# Patient Record
Sex: MALE | Race: BLACK OR AFRICAN AMERICAN | NOT HISPANIC OR LATINO
[De-identification: names, ages, dates, MRNs, and addresses within clinical notes are randomized per-mention and may not be internally consistent; named-entity substitution may affect disease eponyms.]

---

## 2017-07-18 ENCOUNTER — TRANSCRIPTION ENCOUNTER (OUTPATIENT)
Age: 37
End: 2017-07-18

## 2018-01-10 ENCOUNTER — TRANSCRIPTION ENCOUNTER (OUTPATIENT)
Age: 38
End: 2018-01-10

## 2019-07-29 ENCOUNTER — TRANSCRIPTION ENCOUNTER (OUTPATIENT)
Age: 39
End: 2019-07-29

## 2020-05-30 ENCOUNTER — TRANSCRIPTION ENCOUNTER (OUTPATIENT)
Age: 40
End: 2020-05-30

## 2021-02-18 ENCOUNTER — INPATIENT (INPATIENT)
Facility: HOSPITAL | Age: 41
LOS: 7 days | Discharge: HOME | End: 2021-02-26
Attending: SURGERY | Admitting: SURGERY
Payer: COMMERCIAL

## 2021-02-18 VITALS
WEIGHT: 289.91 LBS | OXYGEN SATURATION: 99 % | TEMPERATURE: 99 F | DIASTOLIC BLOOD PRESSURE: 90 MMHG | HEART RATE: 91 BPM | HEIGHT: 71 IN | RESPIRATION RATE: 17 BRPM | SYSTOLIC BLOOD PRESSURE: 137 MMHG

## 2021-02-18 DIAGNOSIS — K63.0 ABSCESS OF INTESTINE: ICD-10-CM

## 2021-02-18 LAB
ALBUMIN SERPL ELPH-MCNC: 3.3 G/DL — SIGNIFICANT CHANGE UP (ref 3.3–5)
ALP SERPL-CCNC: 104 U/L — SIGNIFICANT CHANGE UP (ref 40–120)
ALT FLD-CCNC: 34 U/L — SIGNIFICANT CHANGE UP (ref 12–78)
ANION GAP SERPL CALC-SCNC: 5 MMOL/L — SIGNIFICANT CHANGE UP (ref 5–17)
APPEARANCE UR: CLEAR — SIGNIFICANT CHANGE UP
APTT BLD: 30.8 SEC — SIGNIFICANT CHANGE UP (ref 27.5–35.5)
AST SERPL-CCNC: 14 U/L — LOW (ref 15–37)
BACTERIA # UR AUTO: ABNORMAL
BASOPHILS # BLD AUTO: 0 K/UL — SIGNIFICANT CHANGE UP (ref 0–0.2)
BASOPHILS NFR BLD AUTO: 0 % — SIGNIFICANT CHANGE UP (ref 0–2)
BILIRUB SERPL-MCNC: 0.9 MG/DL — SIGNIFICANT CHANGE UP (ref 0.2–1.2)
BILIRUB UR-MCNC: NEGATIVE — SIGNIFICANT CHANGE UP
BLD GP AB SCN SERPL QL: SIGNIFICANT CHANGE UP
BUN SERPL-MCNC: 10 MG/DL — SIGNIFICANT CHANGE UP (ref 7–23)
CALCIUM SERPL-MCNC: 9.1 MG/DL — SIGNIFICANT CHANGE UP (ref 8.5–10.1)
CHLORIDE SERPL-SCNC: 104 MMOL/L — SIGNIFICANT CHANGE UP (ref 96–108)
CO2 SERPL-SCNC: 28 MMOL/L — SIGNIFICANT CHANGE UP (ref 22–31)
COLOR SPEC: YELLOW — SIGNIFICANT CHANGE UP
CREAT SERPL-MCNC: 1.1 MG/DL — SIGNIFICANT CHANGE UP (ref 0.5–1.3)
DIFF PNL FLD: ABNORMAL
EOSINOPHIL # BLD AUTO: 0 K/UL — SIGNIFICANT CHANGE UP (ref 0–0.5)
EOSINOPHIL NFR BLD AUTO: 0 % — SIGNIFICANT CHANGE UP (ref 0–6)
EPI CELLS # UR: SIGNIFICANT CHANGE UP
FLUAV AG NPH QL: SIGNIFICANT CHANGE UP
FLUBV AG NPH QL: SIGNIFICANT CHANGE UP
GLUCOSE SERPL-MCNC: 96 MG/DL — SIGNIFICANT CHANGE UP (ref 70–99)
GLUCOSE UR QL: NEGATIVE MG/DL — SIGNIFICANT CHANGE UP
HCT VFR BLD CALC: 44.3 % — SIGNIFICANT CHANGE UP (ref 39–50)
HGB BLD-MCNC: 14.1 G/DL — SIGNIFICANT CHANGE UP (ref 13–17)
INR BLD: 1.18 RATIO — HIGH (ref 0.88–1.16)
KETONES UR-MCNC: NEGATIVE — SIGNIFICANT CHANGE UP
LACTATE SERPL-SCNC: 0.8 MMOL/L — SIGNIFICANT CHANGE UP (ref 0.7–2)
LEUKOCYTE ESTERASE UR-ACNC: NEGATIVE — SIGNIFICANT CHANGE UP
LIDOCAIN IGE QN: 77 U/L — SIGNIFICANT CHANGE UP (ref 73–393)
LYMPHOCYTES # BLD AUTO: 1.21 K/UL — SIGNIFICANT CHANGE UP (ref 1–3.3)
LYMPHOCYTES # BLD AUTO: 6 % — LOW (ref 13–44)
MANUAL SMEAR VERIFICATION: SIGNIFICANT CHANGE UP
MCHC RBC-ENTMCNC: 27.1 PG — SIGNIFICANT CHANGE UP (ref 27–34)
MCHC RBC-ENTMCNC: 31.8 GM/DL — LOW (ref 32–36)
MCV RBC AUTO: 85.2 FL — SIGNIFICANT CHANGE UP (ref 80–100)
MONOCYTES # BLD AUTO: 0.81 K/UL — SIGNIFICANT CHANGE UP (ref 0–0.9)
MONOCYTES NFR BLD AUTO: 4 % — SIGNIFICANT CHANGE UP (ref 2–14)
NEUTROPHILS # BLD AUTO: 18.22 K/UL — HIGH (ref 1.8–7.4)
NEUTROPHILS NFR BLD AUTO: 90 % — HIGH (ref 43–77)
NITRITE UR-MCNC: NEGATIVE — SIGNIFICANT CHANGE UP
NRBC # BLD: 0 /100 — SIGNIFICANT CHANGE UP (ref 0–0)
NRBC # BLD: SIGNIFICANT CHANGE UP /100 WBCS (ref 0–0)
PH UR: 5 — SIGNIFICANT CHANGE UP (ref 5–8)
PLAT MORPH BLD: NORMAL — SIGNIFICANT CHANGE UP
PLATELET # BLD AUTO: 399 K/UL — SIGNIFICANT CHANGE UP (ref 150–400)
POTASSIUM SERPL-MCNC: 3.7 MMOL/L — SIGNIFICANT CHANGE UP (ref 3.5–5.3)
POTASSIUM SERPL-SCNC: 3.7 MMOL/L — SIGNIFICANT CHANGE UP (ref 3.5–5.3)
PROT SERPL-MCNC: 7.5 GM/DL — SIGNIFICANT CHANGE UP (ref 6–8.3)
PROT UR-MCNC: 15 MG/DL
PROTHROM AB SERPL-ACNC: 13.6 SEC — SIGNIFICANT CHANGE UP (ref 10.6–13.6)
RBC # BLD: 5.2 M/UL — SIGNIFICANT CHANGE UP (ref 4.2–5.8)
RBC # FLD: 13.6 % — SIGNIFICANT CHANGE UP (ref 10.3–14.5)
RBC BLD AUTO: NORMAL — SIGNIFICANT CHANGE UP
RBC CASTS # UR COMP ASSIST: SIGNIFICANT CHANGE UP /HPF (ref 0–4)
SARS-COV-2 RNA SPEC QL NAA+PROBE: SIGNIFICANT CHANGE UP
SODIUM SERPL-SCNC: 137 MMOL/L — SIGNIFICANT CHANGE UP (ref 135–145)
SP GR SPEC: 1.02 — SIGNIFICANT CHANGE UP (ref 1.01–1.02)
UROBILINOGEN FLD QL: NEGATIVE MG/DL — SIGNIFICANT CHANGE UP
WBC # BLD: 20.24 K/UL — HIGH (ref 3.8–10.5)
WBC # FLD AUTO: 20.24 K/UL — HIGH (ref 3.8–10.5)

## 2021-02-18 PROCEDURE — 74177 CT ABD & PELVIS W/CONTRAST: CPT | Mod: 26,MA

## 2021-02-18 PROCEDURE — 99222 1ST HOSP IP/OBS MODERATE 55: CPT

## 2021-02-18 PROCEDURE — 99285 EMERGENCY DEPT VISIT HI MDM: CPT

## 2021-02-18 RX ORDER — MORPHINE SULFATE 50 MG/1
4 CAPSULE, EXTENDED RELEASE ORAL EVERY 4 HOURS
Refills: 0 | Status: DISCONTINUED | OUTPATIENT
Start: 2021-02-18 | End: 2021-02-23

## 2021-02-18 RX ORDER — SODIUM CHLORIDE 9 MG/ML
1000 INJECTION INTRAMUSCULAR; INTRAVENOUS; SUBCUTANEOUS ONCE
Refills: 0 | Status: COMPLETED | OUTPATIENT
Start: 2021-02-18 | End: 2021-02-18

## 2021-02-18 RX ORDER — SODIUM CHLORIDE 9 MG/ML
1000 INJECTION, SOLUTION INTRAVENOUS ONCE
Refills: 0 | Status: COMPLETED | OUTPATIENT
Start: 2021-02-18 | End: 2021-02-18

## 2021-02-18 RX ORDER — MORPHINE SULFATE 50 MG/1
4 CAPSULE, EXTENDED RELEASE ORAL ONCE
Refills: 0 | Status: DISCONTINUED | OUTPATIENT
Start: 2021-02-18 | End: 2021-02-18

## 2021-02-18 RX ORDER — ENOXAPARIN SODIUM 100 MG/ML
40 INJECTION SUBCUTANEOUS DAILY
Refills: 0 | Status: DISCONTINUED | OUTPATIENT
Start: 2021-02-18 | End: 2021-02-26

## 2021-02-18 RX ORDER — PIPERACILLIN AND TAZOBACTAM 4; .5 G/20ML; G/20ML
3.38 INJECTION, POWDER, LYOPHILIZED, FOR SOLUTION INTRAVENOUS ONCE
Refills: 0 | Status: COMPLETED | OUTPATIENT
Start: 2021-02-18 | End: 2021-02-18

## 2021-02-18 RX ORDER — KETOROLAC TROMETHAMINE 30 MG/ML
15 SYRINGE (ML) INJECTION EVERY 6 HOURS
Refills: 0 | Status: DISCONTINUED | OUTPATIENT
Start: 2021-02-18 | End: 2021-02-18

## 2021-02-18 RX ORDER — SODIUM CHLORIDE 9 MG/ML
1000 INJECTION, SOLUTION INTRAVENOUS
Refills: 0 | Status: DISCONTINUED | OUTPATIENT
Start: 2021-02-18 | End: 2021-02-21

## 2021-02-18 RX ORDER — ACETAMINOPHEN 500 MG
1000 TABLET ORAL ONCE
Refills: 0 | Status: COMPLETED | OUTPATIENT
Start: 2021-02-18 | End: 2021-02-18

## 2021-02-18 RX ORDER — VANCOMYCIN HCL 1 G
1000 VIAL (EA) INTRAVENOUS ONCE
Refills: 0 | Status: COMPLETED | OUTPATIENT
Start: 2021-02-18 | End: 2021-02-18

## 2021-02-18 RX ORDER — ACETAMINOPHEN 500 MG
1000 TABLET ORAL ONCE
Refills: 0 | Status: COMPLETED | OUTPATIENT
Start: 2021-02-18 | End: 2021-02-19

## 2021-02-18 RX ORDER — KETOROLAC TROMETHAMINE 30 MG/ML
30 SYRINGE (ML) INJECTION EVERY 6 HOURS
Refills: 0 | Status: DISCONTINUED | OUTPATIENT
Start: 2021-02-18 | End: 2021-02-21

## 2021-02-18 RX ORDER — ONDANSETRON 8 MG/1
4 TABLET, FILM COATED ORAL ONCE
Refills: 0 | Status: DISCONTINUED | OUTPATIENT
Start: 2021-02-18 | End: 2021-02-18

## 2021-02-18 RX ORDER — PIPERACILLIN AND TAZOBACTAM 4; .5 G/20ML; G/20ML
3.38 INJECTION, POWDER, LYOPHILIZED, FOR SOLUTION INTRAVENOUS EVERY 8 HOURS
Refills: 0 | Status: COMPLETED | OUTPATIENT
Start: 2021-02-19 | End: 2021-02-25

## 2021-02-18 RX ORDER — MORPHINE SULFATE 50 MG/1
2 CAPSULE, EXTENDED RELEASE ORAL EVERY 4 HOURS
Refills: 0 | Status: DISCONTINUED | OUTPATIENT
Start: 2021-02-18 | End: 2021-02-23

## 2021-02-18 RX ORDER — OXYCODONE AND ACETAMINOPHEN 5; 325 MG/1; MG/1
1 TABLET ORAL ONCE
Refills: 0 | Status: DISCONTINUED | OUTPATIENT
Start: 2021-02-18 | End: 2021-02-18

## 2021-02-18 RX ORDER — MORPHINE SULFATE 50 MG/1
2 CAPSULE, EXTENDED RELEASE ORAL EVERY 4 HOURS
Refills: 0 | Status: DISCONTINUED | OUTPATIENT
Start: 2021-02-18 | End: 2021-02-18

## 2021-02-18 RX ADMIN — SODIUM CHLORIDE 1000 MILLILITER(S): 9 INJECTION INTRAMUSCULAR; INTRAVENOUS; SUBCUTANEOUS at 18:34

## 2021-02-18 RX ADMIN — Medication 250 MILLIGRAM(S): at 18:11

## 2021-02-18 RX ADMIN — PIPERACILLIN AND TAZOBACTAM 200 GRAM(S): 4; .5 INJECTION, POWDER, LYOPHILIZED, FOR SOLUTION INTRAVENOUS at 18:11

## 2021-02-18 RX ADMIN — SODIUM CHLORIDE 1000 MILLILITER(S): 9 INJECTION, SOLUTION INTRAVENOUS at 21:14

## 2021-02-18 RX ADMIN — OXYCODONE AND ACETAMINOPHEN 1 TABLET(S): 5; 325 TABLET ORAL at 15:37

## 2021-02-18 RX ADMIN — MORPHINE SULFATE 4 MILLIGRAM(S): 50 CAPSULE, EXTENDED RELEASE ORAL at 18:11

## 2021-02-18 RX ADMIN — PIPERACILLIN AND TAZOBACTAM 3.38 GRAM(S): 4; .5 INJECTION, POWDER, LYOPHILIZED, FOR SOLUTION INTRAVENOUS at 18:58

## 2021-02-18 RX ADMIN — Medication 400 MILLIGRAM(S): at 22:21

## 2021-02-18 RX ADMIN — Medication 1000 MILLIGRAM(S): at 21:20

## 2021-02-18 RX ADMIN — SODIUM CHLORIDE 1000 MILLILITER(S): 9 INJECTION INTRAMUSCULAR; INTRAVENOUS; SUBCUTANEOUS at 18:16

## 2021-02-18 NOTE — ED PROVIDER NOTE - NS ED ROS FT
Constitutional: (+) Chills, (+) Anorexia, (-) Fever  Skin: (-) Rashes  Eyes: (-) Visual changes, (-) Discharge, (-) Redness  Nose: (-) Nasal congestion, (-) Runny nose  Mouth/Throat: (-) Sore throat  CV: (-) Chest pain, (-) Palpitations  Resp: (-) Cough, (-) Shortness of breath, (-) Dyspnea on Exertion, (-) Wheezing  GI: (+) Abdominal pain, (+) Nausea, (-) Vomiting, (-) Diarrhea, (-) Constipation, (-) Melena  : (-) Dysuria, (-) Hematuria, (-) Increased frequency  MSK: (-) Weakness, (-) Myalgias, (-) Back pain  Neuro: (-) Headache

## 2021-02-18 NOTE — ED ADULT TRIAGE NOTE - CHIEF COMPLAINT QUOTE
pt c/o LLQ pain since Monday denies hematuria or dysuria, c/o nausea denies vomiting. Pt states he felt pain to left flank a few times

## 2021-02-18 NOTE — ED PROVIDER NOTE - NONTENDER LOCATION
left upper quadrant/right upper quadrant/right lower quadrant/periumbilical/umbilical/suprapubic/left costovertebral angle/right costovertebral angle

## 2021-02-18 NOTE — H&P ADULT - ATTENDING COMMENTS
Mr. Ahmadi was examined. He continues to have abdominal pain. I have reviewed the cross sectional imaging personally. This patient has a history of colonoscopy and polypectomy approximately 6 months ago. Four days prior to arrival of the [facility] ED he had a repeat colonoscopy and ?polypectomy? That evening he had chills, night sweats, and worsening abdominal pain. On exam: he is febrile, his abdomen is obese, soft, nondistended, he has left shelby-abdominal tenderness. Based on imaging he has descending /sigmoid inflammation with pericolonic abscess.     I have discussed the findings and anticipated course with he and his mother. The surgical team has reached out to his GI to determine the colonoscopy findings.  Will continue bowel rest, nothing by mouth, broad spectrum antibiotics, and multimodal pain control. If he has worsening abdominal pain or signs of clinical deterioration will proceed to the operating room.

## 2021-02-18 NOTE — ED PROVIDER NOTE - CLINICAL SUMMARY MEDICAL DECISION MAKING FREE TEXT BOX
Abdominal pain, nausea, chills x 3 days. Vital signs stable, LLQ tenderness with involuntary guarding. Will get labs, meds, CT abd/pelvis with IV contrast, reassess.

## 2021-02-18 NOTE — PATIENT PROFILE ADULT - NSTOBACCO QUIT READY_GEN_A_CORE_SD
pt was tobacco free since 2011. reports starting smoking again a few weeks ago/not motivated to quit

## 2021-02-18 NOTE — ED PROVIDER NOTE - PROGRESS NOTE DETAILS
Carolyn MALHOTRA: Patient sister Eric and mother Alber given update with patient permission. Mom can be reached at [phone]. VSS. Patient in no acute distress. Pain controlled.

## 2021-02-18 NOTE — H&P ADULT - NSHPPHYSICALEXAM_GEN_ALL_CORE
Vital Signs Last 24 Hrs  T(C): 37.2 (18 Feb 2021 14:04), Max: 37.2 (18 Feb 2021 14:04)  T(F): 99 (18 Feb 2021 14:04), Max: 99 (18 Feb 2021 14:04)  HR: 80 (18 Feb 2021 17:40) (80 - 91)  BP: 139/82 (18 Feb 2021 17:40) (137/90 - 139/82)  RR: 20 (18 Feb 2021 17:40) (17 - 20)  SpO2: 99% (18 Feb 2021 17:40) (99% - 99%)    PHYSICAL EXAM:  GENERAL: NAD, well-groomed, well-developed  HEAD:  Atraumatic, Normocephalic  EYES: EOMI, PERRL, conjunctiva and sclera clear  ENMT: No tonsillar erythema, exudates, or enlargement; Moist mucous membranes  NECK: Supple, No JVD, Normal thyroid  NERVOUS SYSTEM:  Alert & Oriented X3, Good concentration; Motor Strength 5/5 B/L upper and lower extremities  CHEST/LUNG: Clear to auscultation bilaterally; No rales, rhonchi, wheezing, or rubs  HEART: Regular rate and rhythm; No murmurs appreciated  ABDOMEN: Pain to palpation in LLQ, Soft, Nondistended; Bowel sounds present  MSK: ROM intact in all extremities, 5/5 strength in upper and lower extremities, B/L.  EXTREMITIES:  2+ Peripheral Pulses, No clubbing, cyanosis, or edema  LYMPH: No lymphadenopathy noted  SKIN: No rashes or lesions

## 2021-02-18 NOTE — H&P ADULT - HISTORY OF PRESENT ILLNESS
41 YO M with a sig PMHx of colonoscopy with polyp, with no sig PSHx. Patient presents with severe 10/10 sharp pain in the lower abdomen, non-radiating. He took Motrin, which did not help. Patient had a colonoscopy last week Teusday, for re-evaluation for new polyps. Patient states he last had a normal BM yesterday, and a small BM this AM. Patient admits to occasional cigarette smoking, and ETOH on weekends. Denies CP, SOB, palpitations, nausea or vomiting.

## 2021-02-18 NOTE — ED PROVIDER NOTE - ATTENDING CONTRIBUTION TO CARE
I have seen the patient with the PA and agree with above examination and assessment and plan with the following addendum:        Focused PE:   General: NAD, alert and oriented  Head: Normocephalic, atraumatic  Eyes: PERRLA, EOMI  Cardiac: RRR, no murmurs, rubs or gallops  Resp: CTA, no wheezes, rales or ronchi  GI: obese. tender in LLQ. Rebound, no guarding  MSK: non edematous  Neuro: Alert and oriented, no focal deficits. Normal gait  Ext: Non edematous, nontender.     Ddx: Perforation from colonoscopy/ colitis  Plan: Cbc, cmp, lactate, blood cx, pain control, ct abd, reassess

## 2021-02-18 NOTE — H&P ADULT - PROBLEM SELECTOR PLAN 1
Admit to surgery service  Continue Zosyn  NPO  IV hydration  Pain management  Serial exams  IR consult

## 2021-02-18 NOTE — ED PROVIDER NOTE - OBJECTIVE STATEMENT
40y Male with no PMHx presents to the ER for abdominal pain. Patient reports left lower quadrant abdominal pain x 4 days. Reports achy, consistent 4/10 pain. States today pain became 10/10. Motrin/Tylenol has made pain tolerable over the last few days. Associated with nausea, decreased appetite and chills. No vomiting, diarrhea or fever.    Of note, patient had colonoscopy last Tuesday. States that similar pain happened after taking the colonoscopy prep but resolved after the colonoscopy.

## 2021-02-18 NOTE — H&P ADULT - NSHPREVIEWOFSYSTEMS_GEN_ALL_CORE
REVIEW OF SYSTEMS:  Constitutional: Denies fever, weight loss, fatigue  Eye: Denies eye pain, visual changes, discharge, blurred vision  ENT: Denies hearing changes, tinnitus, vertigo, sinus congestion, sore throat  Neck: Denies pain or stiffness  Respiratory: Denies cough, wheezing, chills, hemoptysis, shortness of breath, difficulty breathing  Cardiovascular: Denies chest pain, palpitations, dizziness, leg swelling  Gastrointestinal: Denies abdominal pain, nausea, vomiting, hematemesis, diarrhea, constipation, melena, hematochezia  Genitourinary: Denies dysuria, frequency, hematuria, retention, incontinence  Neurological: Denies headaches, memory loss, loss of strength, numbness, tremors  Skin: Denies itching, burning, rashes, lesions   Endocrine: Denies heat or cold intolerance, hair loss  Musculoskeletal: Denies joint pain or swelling, back, extremity pain  Psychiatric: Denies depression, anxiety, mood swings, difficulty sleeping, suicidal ideation  Hematology: Denies easy bruising, bleeding gums  Immunologic: Denies hives or eczema

## 2021-02-19 LAB
ANION GAP SERPL CALC-SCNC: 9 MMOL/L — SIGNIFICANT CHANGE UP (ref 5–17)
BUN SERPL-MCNC: 9 MG/DL — SIGNIFICANT CHANGE UP (ref 7–23)
CALCIUM SERPL-MCNC: 8.5 MG/DL — SIGNIFICANT CHANGE UP (ref 8.5–10.1)
CHLORIDE SERPL-SCNC: 103 MMOL/L — SIGNIFICANT CHANGE UP (ref 96–108)
CO2 SERPL-SCNC: 26 MMOL/L — SIGNIFICANT CHANGE UP (ref 22–31)
CREAT SERPL-MCNC: 1.05 MG/DL — SIGNIFICANT CHANGE UP (ref 0.5–1.3)
CULTURE RESULTS: SIGNIFICANT CHANGE UP
GLUCOSE SERPL-MCNC: 106 MG/DL — HIGH (ref 70–99)
HCT VFR BLD CALC: 39.7 % — SIGNIFICANT CHANGE UP (ref 39–50)
HGB BLD-MCNC: 13.1 G/DL — SIGNIFICANT CHANGE UP (ref 13–17)
MAGNESIUM SERPL-MCNC: 1.9 MG/DL — SIGNIFICANT CHANGE UP (ref 1.6–2.6)
MCHC RBC-ENTMCNC: 27.9 PG — SIGNIFICANT CHANGE UP (ref 27–34)
MCHC RBC-ENTMCNC: 33 GM/DL — SIGNIFICANT CHANGE UP (ref 32–36)
MCV RBC AUTO: 84.6 FL — SIGNIFICANT CHANGE UP (ref 80–100)
NRBC # BLD: 0 /100 WBCS — SIGNIFICANT CHANGE UP (ref 0–0)
PHOSPHATE SERPL-MCNC: 3.5 MG/DL — SIGNIFICANT CHANGE UP (ref 2.5–4.5)
PLATELET # BLD AUTO: 370 K/UL — SIGNIFICANT CHANGE UP (ref 150–400)
POTASSIUM SERPL-MCNC: 3.3 MMOL/L — LOW (ref 3.5–5.3)
POTASSIUM SERPL-SCNC: 3.3 MMOL/L — LOW (ref 3.5–5.3)
RBC # BLD: 4.69 M/UL — SIGNIFICANT CHANGE UP (ref 4.2–5.8)
RBC # FLD: 13.8 % — SIGNIFICANT CHANGE UP (ref 10.3–14.5)
SARS-COV-2 IGG SERPL QL IA: NEGATIVE — SIGNIFICANT CHANGE UP
SARS-COV-2 IGM SERPL IA-ACNC: <0.1 INDEX — SIGNIFICANT CHANGE UP
SODIUM SERPL-SCNC: 138 MMOL/L — SIGNIFICANT CHANGE UP (ref 135–145)
SPECIMEN SOURCE: SIGNIFICANT CHANGE UP
WBC # BLD: 21.29 K/UL — HIGH (ref 3.8–10.5)
WBC # FLD AUTO: 21.29 K/UL — HIGH (ref 3.8–10.5)

## 2021-02-19 PROCEDURE — 99232 SBSQ HOSP IP/OBS MODERATE 35: CPT

## 2021-02-19 RX ORDER — POTASSIUM CHLORIDE 20 MEQ
10 PACKET (EA) ORAL
Refills: 0 | Status: COMPLETED | OUTPATIENT
Start: 2021-02-19 | End: 2021-02-19

## 2021-02-19 RX ADMIN — MORPHINE SULFATE 2 MILLIGRAM(S): 50 CAPSULE, EXTENDED RELEASE ORAL at 16:03

## 2021-02-19 RX ADMIN — Medication 30 MILLIGRAM(S): at 23:35

## 2021-02-19 RX ADMIN — SODIUM CHLORIDE 170 MILLILITER(S): 9 INJECTION, SOLUTION INTRAVENOUS at 21:17

## 2021-02-19 RX ADMIN — Medication 400 MILLIGRAM(S): at 16:04

## 2021-02-19 RX ADMIN — Medication 100 MILLIEQUIVALENT(S): at 08:52

## 2021-02-19 RX ADMIN — Medication 100 MILLIEQUIVALENT(S): at 11:41

## 2021-02-19 RX ADMIN — Medication 30 MILLIGRAM(S): at 00:52

## 2021-02-19 RX ADMIN — PIPERACILLIN AND TAZOBACTAM 25 GRAM(S): 4; .5 INJECTION, POWDER, LYOPHILIZED, FOR SOLUTION INTRAVENOUS at 08:39

## 2021-02-19 RX ADMIN — Medication 30 MILLIGRAM(S): at 18:13

## 2021-02-19 RX ADMIN — Medication 30 MILLIGRAM(S): at 11:43

## 2021-02-19 RX ADMIN — PIPERACILLIN AND TAZOBACTAM 25 GRAM(S): 4; .5 INJECTION, POWDER, LYOPHILIZED, FOR SOLUTION INTRAVENOUS at 00:51

## 2021-02-19 RX ADMIN — MORPHINE SULFATE 2 MILLIGRAM(S): 50 CAPSULE, EXTENDED RELEASE ORAL at 08:38

## 2021-02-19 RX ADMIN — Medication 100 MILLIEQUIVALENT(S): at 10:22

## 2021-02-19 RX ADMIN — PIPERACILLIN AND TAZOBACTAM 25 GRAM(S): 4; .5 INJECTION, POWDER, LYOPHILIZED, FOR SOLUTION INTRAVENOUS at 18:13

## 2021-02-19 RX ADMIN — Medication 30 MILLIGRAM(S): at 05:56

## 2021-02-19 RX ADMIN — ENOXAPARIN SODIUM 40 MILLIGRAM(S): 100 INJECTION SUBCUTANEOUS at 11:44

## 2021-02-19 NOTE — PROGRESS NOTE ADULT - SUBJECTIVE AND OBJECTIVE BOX
SURGERY PROGRESS HPI:  Pt seen and examined at bedside. Left abdominal pain is improving, well controlled on pain medication. Patient received Ofirmev and Toradol overnight. Did not need morphine. Pt denies complaints. Pt tolerating ice chips. Pt denies nausea and vomiting. Passed flatus this morning. Last BM was small yesterday morning. Voiding well. Pt denies chest pain, SOB, dizziness, fever, chills. Ambulating.      Vital Signs Last 24 Hrs  T(C): 37.3 (2021 00:06), Max: 38.5 (2021 22:05)  T(F): 99.1 (2021 00:06), Max: 101.3 (2021 22:05)  HR: 109 (2021 00:06) (80 - 109)  BP: 132/75 (2021 00:06) (125/85 - 143/81)  BP(mean): --  RR: 18 (2021 00:06) (17 - 20)  SpO2: 95% (2021 00:06) (95% - 99%)      PHYSICAL EXAM:    GENERAL: NAD  CHEST/LUNG: Clear to ausculation, bilaterally   HEART: RRR S1S2  ABDOMEN: Obese, non distended, +BS, soft, left-sided tenderness, no guarding, no rigidity   EXTREMITIES:  calf soft, non tender b/l      I&O's Detail      LABS:                        14.1   20.24 )-----------( 399      ( 2021 15:49 )             44.3     02-18    137  |  104  |  10  ----------------------------<  96  3.7   |  28  |  1.10    Ca    9.1      2021 15:49    TPro  7.5  /  Alb  3.3  /  TBili  0.9  /  DBili  x   /  AST  14<L>  /  ALT  34  /  AlkPhos  104  02-18    PT/INR - ( 2021 17:45 )   PT: 13.6 sec;   INR: 1.18 ratio         PTT - ( 2021 17:45 )  PTT:30.8 sec  Urinalysis Basic - ( 2021 17:49 )    Color: Yellow / Appearance: Clear / S.020 / pH: x  Gluc: x / Ketone: Negative  / Bili: Negative / Urobili: Negative mg/dL   Blood: x / Protein: 15 mg/dL / Nitrite: Negative   Leuk Esterase: Negative / RBC: 0-2 /HPF / WBC x   Sq Epi: x / Non Sq Epi: Occasional / Bacteria: Moderate        Assessment: 40M PMHx colonoscopy and polypectomy approximately 6 months ago, repeat colonoscopy and ?polypectomy admitted with colitis and perisigmoid fluid collection 1.5 x 1.2 x 3.8 cm. Tmax overnight: 101.3.    Plan:  -Continue Zosyn  -NPO with ice chips, IV hydration  -IR evaluation today  -Awaiting call back from GI MD Dr. Manrique [phone].  -pain management prn  -continue DVT prophylaxis, OOB, Ambulating   -f/u AM labs  -Pt seen and examined with Dr. Garsia last night who discussed plan with patient's mother ([phone]). SURGERY PROGRESS HPI:  Pt seen and examined at bedside. Left abdominal pain is improving, well controlled on pain medication. Patient received Ofirmev and Toradol overnight. Did not need morphine. Pt denies complaints. Pt tolerating ice chips. Pt denies nausea and vomiting. Passed flatus this morning. Last BM was small yesterday morning. Voiding well. Pt denies chest pain, SOB, dizziness, fever, chills. Ambulating.      Vital Signs Last 24 Hrs  T(C): 37.3 (2021 00:06), Max: 38.5 (2021 22:05)  T(F): 99.1 (2021 00:06), Max: 101.3 (2021 22:05)  HR: 109 (2021 00:06) (80 - 109)  BP: 132/75 (2021 00:06) (125/85 - 143/81)  BP(mean): --  RR: 18 (2021 00:06) (17 - 20)  SpO2: 95% (2021 00:06) (95% - 99%)      PHYSICAL EXAM:    GENERAL: NAD  CHEST/LUNG: Clear to ausculation, bilaterally   HEART: RRR S1S2  ABDOMEN: Obese, non distended, +BS, soft, left-sided tenderness, no guarding, no rigidity   EXTREMITIES:  calf soft, non tender b/l      I&O's Detail      LABS:                        14.1   20.24 )-----------( 399      ( 2021 15:49 )             44.3     02-18    137  |  104  |  10  ----------------------------<  96  3.7   |  28  |  1.10    Ca    9.1      2021 15:49    TPro  7.5  /  Alb  3.3  /  TBili  0.9  /  DBili  x   /  AST  14<L>  /  ALT  34  /  AlkPhos  104  02-18    PT/INR - ( 2021 17:45 )   PT: 13.6 sec;   INR: 1.18 ratio         PTT - ( 2021 17:45 )  PTT:30.8 sec  Urinalysis Basic - ( 2021 17:49 )    Color: Yellow / Appearance: Clear / S.020 / pH: x  Gluc: x / Ketone: Negative  / Bili: Negative / Urobili: Negative mg/dL   Blood: x / Protein: 15 mg/dL / Nitrite: Negative   Leuk Esterase: Negative / RBC: 0-2 /HPF / WBC x   Sq Epi: x / Non Sq Epi: Occasional / Bacteria: Moderate        Assessment: 40M PMHx colonoscopy and polypectomy approximately 6 months ago, recent repeat colonoscopy and ?polypectomy admitted with colitis and perisigmoid fluid collection 1.5 x 1.2 x 3.8 cm. Tmax overnight: 101.3.    Plan:  -Continue Zosyn  -NPO with ice chips, IV hydration  -IR evaluation today  -Awaiting call back from GI MD Dr. Manrique [phone].  -pain management prn  -continue DVT prophylaxis, OOB, Ambulating   -f/u AM labs  -Pt seen and examined with Dr. Garsia last night who discussed plan with patient's mother ([phone]).

## 2021-02-19 NOTE — CONSULT NOTE ADULT - ASSESSMENT
Interventional Radiology    Evaluate for Procedure: abscess drain    HPI: 40y Male with severe sigmoiditis/likely perforation    Allergies:   Medications (Abx/Cardiac/Anticoagulation/Blood Products)    piperacillin/tazobactam IVPB..: 25 mL/Hr IV Intermittent (02-19 @ 00:51)  piperacillin/tazobactam IVPB...: 200 mL/Hr IV Intermittent (02-18 @ 18:11)  vancomycin  IVPB.: 250 mL/Hr IV Intermittent (02-18 @ 18:11)    Data:  180.3  130.6  T(C): 37.4  HR: 97  BP: 119/80  RR: 18  SpO2: 95%    -WBC 21.29 / HgB 13.1 / Hct 39.7 / Plt 370  -Na 138 / Cl 103 / BUN 9 / Glucose 106  -K 3.3 / CO2 26 / Cr 1.05  -ALT -- / Alk Phos -- / T.Bili --  -INR 1.18 / PTT 30.8    Radiology:     Assessment/Plan:   -40y Male with severe colitis/likely perf at the prox sigmoid.  -No drainable collection, mostly edema/inflammation/possible phlegmon. Recommend conservative management and repeat imaging to reassess for abscess formation if any.

## 2021-02-20 LAB
ANION GAP SERPL CALC-SCNC: 9 MMOL/L — SIGNIFICANT CHANGE UP (ref 5–17)
BUN SERPL-MCNC: 8 MG/DL — SIGNIFICANT CHANGE UP (ref 7–23)
CALCIUM SERPL-MCNC: 8.6 MG/DL — SIGNIFICANT CHANGE UP (ref 8.5–10.1)
CHLORIDE SERPL-SCNC: 103 MMOL/L — SIGNIFICANT CHANGE UP (ref 96–108)
CO2 SERPL-SCNC: 25 MMOL/L — SIGNIFICANT CHANGE UP (ref 22–31)
CREAT SERPL-MCNC: 0.94 MG/DL — SIGNIFICANT CHANGE UP (ref 0.5–1.3)
GLUCOSE SERPL-MCNC: 68 MG/DL — LOW (ref 70–99)
HCT VFR BLD CALC: 39.1 % — SIGNIFICANT CHANGE UP (ref 39–50)
HGB BLD-MCNC: 12.6 G/DL — LOW (ref 13–17)
MAGNESIUM SERPL-MCNC: 2.1 MG/DL — SIGNIFICANT CHANGE UP (ref 1.6–2.6)
MCHC RBC-ENTMCNC: 27.1 PG — SIGNIFICANT CHANGE UP (ref 27–34)
MCHC RBC-ENTMCNC: 32.2 GM/DL — SIGNIFICANT CHANGE UP (ref 32–36)
MCV RBC AUTO: 84.1 FL — SIGNIFICANT CHANGE UP (ref 80–100)
NRBC # BLD: 0 /100 WBCS — SIGNIFICANT CHANGE UP (ref 0–0)
PHOSPHATE SERPL-MCNC: 2 MG/DL — LOW (ref 2.5–4.5)
PLATELET # BLD AUTO: 349 K/UL — SIGNIFICANT CHANGE UP (ref 150–400)
POTASSIUM SERPL-MCNC: 3.4 MMOL/L — LOW (ref 3.5–5.3)
POTASSIUM SERPL-SCNC: 3.4 MMOL/L — LOW (ref 3.5–5.3)
RBC # BLD: 4.65 M/UL — SIGNIFICANT CHANGE UP (ref 4.2–5.8)
RBC # FLD: 13.7 % — SIGNIFICANT CHANGE UP (ref 10.3–14.5)
SODIUM SERPL-SCNC: 137 MMOL/L — SIGNIFICANT CHANGE UP (ref 135–145)
WBC # BLD: 23.57 K/UL — HIGH (ref 3.8–10.5)
WBC # FLD AUTO: 23.57 K/UL — HIGH (ref 3.8–10.5)

## 2021-02-20 PROCEDURE — 71045 X-RAY EXAM CHEST 1 VIEW: CPT | Mod: 26

## 2021-02-20 RX ORDER — ACETAMINOPHEN 500 MG
1000 TABLET ORAL ONCE
Refills: 0 | Status: COMPLETED | OUTPATIENT
Start: 2021-02-20 | End: 2021-02-20

## 2021-02-20 RX ORDER — ACETAMINOPHEN 500 MG
1000 TABLET ORAL ONCE
Refills: 0 | Status: COMPLETED | OUTPATIENT
Start: 2021-02-20 | End: 2021-02-21

## 2021-02-20 RX ORDER — POTASSIUM PHOSPHATE, MONOBASIC POTASSIUM PHOSPHATE, DIBASIC 236; 224 MG/ML; MG/ML
30 INJECTION, SOLUTION INTRAVENOUS ONCE
Refills: 0 | Status: COMPLETED | OUTPATIENT
Start: 2021-02-20 | End: 2021-02-20

## 2021-02-20 RX ADMIN — MORPHINE SULFATE 2 MILLIGRAM(S): 50 CAPSULE, EXTENDED RELEASE ORAL at 08:27

## 2021-02-20 RX ADMIN — PIPERACILLIN AND TAZOBACTAM 25 GRAM(S): 4; .5 INJECTION, POWDER, LYOPHILIZED, FOR SOLUTION INTRAVENOUS at 16:44

## 2021-02-20 RX ADMIN — PIPERACILLIN AND TAZOBACTAM 25 GRAM(S): 4; .5 INJECTION, POWDER, LYOPHILIZED, FOR SOLUTION INTRAVENOUS at 23:07

## 2021-02-20 RX ADMIN — Medication 400 MILLIGRAM(S): at 00:44

## 2021-02-20 RX ADMIN — Medication 30 MILLIGRAM(S): at 16:43

## 2021-02-20 RX ADMIN — Medication 400 MILLIGRAM(S): at 06:38

## 2021-02-20 RX ADMIN — SODIUM CHLORIDE 170 MILLILITER(S): 9 INJECTION, SOLUTION INTRAVENOUS at 02:41

## 2021-02-20 RX ADMIN — PIPERACILLIN AND TAZOBACTAM 25 GRAM(S): 4; .5 INJECTION, POWDER, LYOPHILIZED, FOR SOLUTION INTRAVENOUS at 00:44

## 2021-02-20 RX ADMIN — MORPHINE SULFATE 2 MILLIGRAM(S): 50 CAPSULE, EXTENDED RELEASE ORAL at 12:43

## 2021-02-20 RX ADMIN — Medication 30 MILLIGRAM(S): at 23:07

## 2021-02-20 RX ADMIN — POTASSIUM PHOSPHATE, MONOBASIC POTASSIUM PHOSPHATE, DIBASIC 83.33 MILLIMOLE(S): 236; 224 INJECTION, SOLUTION INTRAVENOUS at 08:47

## 2021-02-20 RX ADMIN — MORPHINE SULFATE 4 MILLIGRAM(S): 50 CAPSULE, EXTENDED RELEASE ORAL at 18:48

## 2021-02-20 RX ADMIN — ENOXAPARIN SODIUM 40 MILLIGRAM(S): 100 INJECTION SUBCUTANEOUS at 11:21

## 2021-02-20 RX ADMIN — Medication 30 MILLIGRAM(S): at 06:10

## 2021-02-20 RX ADMIN — PIPERACILLIN AND TAZOBACTAM 25 GRAM(S): 4; .5 INJECTION, POWDER, LYOPHILIZED, FOR SOLUTION INTRAVENOUS at 08:28

## 2021-02-20 RX ADMIN — MORPHINE SULFATE 4 MILLIGRAM(S): 50 CAPSULE, EXTENDED RELEASE ORAL at 02:37

## 2021-02-20 RX ADMIN — Medication 400 MILLIGRAM(S): at 16:43

## 2021-02-20 NOTE — PROGRESS NOTE ADULT - SUBJECTIVE AND OBJECTIVE BOX
SURGERY PROGRESS HPI:  Pt seen and examined at bedside. L-sided abdominal pain is similar to yesterday, well controlled on pain medication. No acute events overnight. Pt tolerating ice chips. Pt denies nausea and vomiting. Passing flatus. No BM overnight. Voiding well. Pt denies chest pain, SOB, dizziness, fever, chills. Ambulating.      Vital Signs Last 24 Hrs  T(C): 36.8 (2021 02:48), Max: 38.3 (2021 17:02)  T(F): 98.2 (2021 02:48), Max: 101 (2021 17:02)  HR: 97 (2021 23:34) (97 - 102)  BP: 140/72 (2021 23:34) (124/82 - 140/72)  BP(mean): --  RR: 18 (2021 23:34) (18 - 19)  SpO2: 95% (2021 23:34) (93% - 95%)      PHYSICAL EXAM:  GENERAL: NAD  CHEST/LUNG: Clear to ausculation, bilaterally   HEART: RRR S1S2  ABDOMEN: Obese, non distended, +BS, soft, left-sided tenderness, no guarding, no rigidity   EXTREMITIES:  calf soft, non tender b/l    I&O's Detail    2021 07:01  -  2021 07:00  --------------------------------------------------------  IN:  Total IN: 0 mL    OUT:    Voided (mL): 500 mL  Total OUT: 500 mL    Total NET: -500 mL      2021 07:01  -  2021 06:27  --------------------------------------------------------  IN:    Oral Fluid: 240 mL  Total IN: 240 mL    OUT:    Voided (mL): 500 mL  Total OUT: 500 mL    Total NET: -260 mL          LABS:                        13.1   21.29 )-----------( 370      ( 2021 07:54 )             39.7         138  |  103  |  9   ----------------------------<  106<H>  3.3<L>   |  26  |  1.05    Ca    8.5      2021 07:54  Phos  3.5       Mg     1.9         TPro  7.5  /  Alb  3.3  /  TBili  0.9  /  DBili  x   /  AST  14<L>  /  ALT  34  /  AlkPhos  104      PT/INR - ( 2021 17:45 )   PT: 13.6 sec;   INR: 1.18 ratio         PTT - ( 2021 17:45 )  PTT:30.8 sec  Urinalysis Basic - ( 2021 17:49 )    Color: Yellow / Appearance: Clear / S.020 / pH: x  Gluc: x / Ketone: Negative  / Bili: Negative / Urobili: Negative mg/dL   Blood: x / Protein: 15 mg/dL / Nitrite: Negative   Leuk Esterase: Negative / RBC: 0-2 /HPF / WBC x   Sq Epi: x / Non Sq Epi: Occasional / Bacteria: Moderate      Culture Results:   No growth to date. ( @ 23:01)  Culture Results:   No growth to date. ( @ 23:01)  Culture Results:   <10,000 CFU/mL Normal Urogenital Loulou ( @ 22:56)        Assessment: 40M PMHx colonoscopy and polypectomy approximately 6 months ago, recent repeat colonoscopy and ?polypectomy admitted with colitis and perisigmoid fluid collection 1.5 x 1.2 x 3.8 cm. Tmax yesterday: 101.  Colonoscopy reports: Patient has history of diverticulosis noted on 6/3/20 report. He has had two polyps 4.9 cm and 0.9 cm removed resulting tubulovillous adenoma negative for high grade dysplasia or malignancy and polypoid colonic mucosa negative for adenoma / malignancy respectively on 6/3/20. On 21 he had repeat colonoscopy and biopsy of rectum hyperplastic polyp 0.2cm negative for adenoma or malignancy. No diverticulosis noted on this colonoscopy.  No drainable collection per IR.    Plan:  -Continue Zosyn  -NPO with ice chips, IV hydration  -pain management prn  -continue DVT prophylaxis, OOB, Ambulating   -f/u AM labs  -Will d/w attending SURGERY PROGRESS HPI:  Pt seen and examined at bedside. L-sided abdominal pain is similar to yesterday, well controlled on pain medication. No acute events overnight. Pt tolerating ice chips. Pt denies nausea and vomiting. Passing flatus. No BM overnight. Voiding well. Pt denies chest pain, SOB, dizziness, fever, chills. Ambulating.      Vital Signs Last 24 Hrs  T(C): 36.8 (2021 02:48), Max: 38.3 (2021 17:02)  T(F): 98.2 (2021 02:48), Max: 101 (2021 17:02)  HR: 97 (2021 23:34) (97 - 102)  BP: 140/72 (2021 23:34) (124/82 - 140/72)  BP(mean): --  RR: 18 (2021 23:34) (18 - 19)  SpO2: 95% (2021 23:34) (93% - 95%)      PHYSICAL EXAM:  GENERAL: NAD  CHEST/LUNG: Clear to ausculation, bilaterally   HEART: RRR S1S2  ABDOMEN: Obese, non distended, +BS, soft, left-sided tenderness, no guarding, no rigidity   EXTREMITIES:  calf soft, non tender b/l    I&O's Detail    2021 07:01  -  2021 07:00  --------------------------------------------------------  IN:  Total IN: 0 mL    OUT:    Voided (mL): 500 mL  Total OUT: 500 mL    Total NET: -500 mL      2021 07:01  -  2021 06:27  --------------------------------------------------------  IN:    Oral Fluid: 240 mL  Total IN: 240 mL    OUT:    Voided (mL): 500 mL  Total OUT: 500 mL    Total NET: -260 mL          LABS:                        13.1   21.29 )-----------( 370      ( 2021 07:54 )             39.7         138  |  103  |  9   ----------------------------<  106<H>  3.3<L>   |  26  |  1.05    Ca    8.5      2021 07:54  Phos  3.5       Mg     1.9         TPro  7.5  /  Alb  3.3  /  TBili  0.9  /  DBili  x   /  AST  14<L>  /  ALT  34  /  AlkPhos  104      PT/INR - ( 2021 17:45 )   PT: 13.6 sec;   INR: 1.18 ratio         PTT - ( 2021 17:45 )  PTT:30.8 sec  Urinalysis Basic - ( 2021 17:49 )    Color: Yellow / Appearance: Clear / S.020 / pH: x  Gluc: x / Ketone: Negative  / Bili: Negative / Urobili: Negative mg/dL   Blood: x / Protein: 15 mg/dL / Nitrite: Negative   Leuk Esterase: Negative / RBC: 0-2 /HPF / WBC x   Sq Epi: x / Non Sq Epi: Occasional / Bacteria: Moderate      Culture Results:   No growth to date. ( @ 23:01)  Culture Results:   No growth to date. ( @ 23:01)  Culture Results:   <10,000 CFU/mL Normal Urogenital Loulou ( @ 22:56)        Assessment: 40M PMHx colonoscopy and polypectomy approximately 6 months ago, recent repeat colonoscopy and ?polypectomy admitted with colitis and perisigmoid fluid collection 1.5 x 1.2 x 3.8 cm.   Colonoscopy reports: Patient has history of diverticulosis noted on 6/3/20 report. He has had two polyps 4.9 cm and 0.9 cm removed resulting tubulovillous adenoma negative for high grade dysplasia or malignancy and polypoid colonic mucosa negative for adenoma / malignancy respectively on 6/3/20. On 21 he had repeat colonoscopy and biopsy of rectum hyperplastic polyp 0.2cm negative for adenoma or malignancy. No diverticulosis noted on this colonoscopy.  No drainable collection per IR. Tmax yesterday: 101.    Plan:  -Continue Zosyn  -NPO with ice chips, IV hydration  -pain management prn  -continue DVT prophylaxis, OOB, Ambulating   -f/u AM labs  -Will d/w attending

## 2021-02-21 LAB
ANION GAP SERPL CALC-SCNC: 11 MMOL/L — SIGNIFICANT CHANGE UP (ref 5–17)
BUN SERPL-MCNC: 8 MG/DL — SIGNIFICANT CHANGE UP (ref 7–23)
CALCIUM SERPL-MCNC: 8.5 MG/DL — SIGNIFICANT CHANGE UP (ref 8.5–10.1)
CHLORIDE SERPL-SCNC: 105 MMOL/L — SIGNIFICANT CHANGE UP (ref 96–108)
CO2 SERPL-SCNC: 23 MMOL/L — SIGNIFICANT CHANGE UP (ref 22–31)
CREAT SERPL-MCNC: 0.9 MG/DL — SIGNIFICANT CHANGE UP (ref 0.5–1.3)
GLUCOSE SERPL-MCNC: 59 MG/DL — LOW (ref 70–99)
HCT VFR BLD CALC: 37.1 % — LOW (ref 39–50)
HGB BLD-MCNC: 12 G/DL — LOW (ref 13–17)
MAGNESIUM SERPL-MCNC: 1.8 MG/DL — SIGNIFICANT CHANGE UP (ref 1.6–2.6)
MCHC RBC-ENTMCNC: 27.1 PG — SIGNIFICANT CHANGE UP (ref 27–34)
MCHC RBC-ENTMCNC: 32.3 GM/DL — SIGNIFICANT CHANGE UP (ref 32–36)
MCV RBC AUTO: 83.9 FL — SIGNIFICANT CHANGE UP (ref 80–100)
NRBC # BLD: 0 /100 WBCS — SIGNIFICANT CHANGE UP (ref 0–0)
PHOSPHATE SERPL-MCNC: 2.3 MG/DL — LOW (ref 2.5–4.5)
PLATELET # BLD AUTO: 349 K/UL — SIGNIFICANT CHANGE UP (ref 150–400)
POTASSIUM SERPL-MCNC: 3.4 MMOL/L — LOW (ref 3.5–5.3)
POTASSIUM SERPL-SCNC: 3.4 MMOL/L — LOW (ref 3.5–5.3)
RBC # BLD: 4.42 M/UL — SIGNIFICANT CHANGE UP (ref 4.2–5.8)
RBC # FLD: 13.7 % — SIGNIFICANT CHANGE UP (ref 10.3–14.5)
SODIUM SERPL-SCNC: 139 MMOL/L — SIGNIFICANT CHANGE UP (ref 135–145)
WBC # BLD: 24.4 K/UL — HIGH (ref 3.8–10.5)
WBC # FLD AUTO: 24.4 K/UL — HIGH (ref 3.8–10.5)

## 2021-02-21 PROCEDURE — 74177 CT ABD & PELVIS W/CONTRAST: CPT | Mod: 26

## 2021-02-21 RX ORDER — IOHEXOL 300 MG/ML
30 INJECTION, SOLUTION INTRAVENOUS ONCE
Refills: 0 | Status: COMPLETED | OUTPATIENT
Start: 2021-02-21 | End: 2021-02-21

## 2021-02-21 RX ORDER — POTASSIUM CHLORIDE 20 MEQ
40 PACKET (EA) ORAL ONCE
Refills: 0 | Status: DISCONTINUED | OUTPATIENT
Start: 2021-02-21 | End: 2021-02-21

## 2021-02-21 RX ORDER — POTASSIUM CHLORIDE 20 MEQ
10 PACKET (EA) ORAL
Refills: 0 | Status: DISCONTINUED | OUTPATIENT
Start: 2021-02-21 | End: 2021-02-21

## 2021-02-21 RX ORDER — ONDANSETRON 8 MG/1
4 TABLET, FILM COATED ORAL EVERY 6 HOURS
Refills: 0 | Status: DISCONTINUED | OUTPATIENT
Start: 2021-02-21 | End: 2021-02-23

## 2021-02-21 RX ORDER — POTASSIUM CHLORIDE 20 MEQ
40 PACKET (EA) ORAL ONCE
Refills: 0 | Status: COMPLETED | OUTPATIENT
Start: 2021-02-21 | End: 2021-02-21

## 2021-02-21 RX ORDER — POTASSIUM PHOSPHATE, MONOBASIC POTASSIUM PHOSPHATE, DIBASIC 236; 224 MG/ML; MG/ML
15 INJECTION, SOLUTION INTRAVENOUS ONCE
Refills: 0 | Status: COMPLETED | OUTPATIENT
Start: 2021-02-21 | End: 2021-02-21

## 2021-02-21 RX ORDER — DEXTROSE MONOHYDRATE, SODIUM CHLORIDE, AND POTASSIUM CHLORIDE 50; .745; 4.5 G/1000ML; G/1000ML; G/1000ML
1000 INJECTION, SOLUTION INTRAVENOUS
Refills: 0 | Status: DISCONTINUED | OUTPATIENT
Start: 2021-02-21 | End: 2021-02-25

## 2021-02-21 RX ADMIN — Medication 30 MILLIGRAM(S): at 17:34

## 2021-02-21 RX ADMIN — Medication 30 MILLIGRAM(S): at 05:34

## 2021-02-21 RX ADMIN — DEXTROSE MONOHYDRATE, SODIUM CHLORIDE, AND POTASSIUM CHLORIDE 150 MILLILITER(S): 50; .745; 4.5 INJECTION, SOLUTION INTRAVENOUS at 09:34

## 2021-02-21 RX ADMIN — ONDANSETRON 4 MILLIGRAM(S): 8 TABLET, FILM COATED ORAL at 08:30

## 2021-02-21 RX ADMIN — ENOXAPARIN SODIUM 40 MILLIGRAM(S): 100 INJECTION SUBCUTANEOUS at 12:22

## 2021-02-21 RX ADMIN — Medication 30 MILLIGRAM(S): at 12:22

## 2021-02-21 RX ADMIN — POTASSIUM PHOSPHATE, MONOBASIC POTASSIUM PHOSPHATE, DIBASIC 62.5 MILLIMOLE(S): 236; 224 INJECTION, SOLUTION INTRAVENOUS at 12:22

## 2021-02-21 RX ADMIN — MORPHINE SULFATE 2 MILLIGRAM(S): 50 CAPSULE, EXTENDED RELEASE ORAL at 20:42

## 2021-02-21 RX ADMIN — IOHEXOL 30 MILLILITER(S): 300 INJECTION, SOLUTION INTRAVENOUS at 12:22

## 2021-02-21 RX ADMIN — Medication 400 MILLIGRAM(S): at 06:03

## 2021-02-21 RX ADMIN — PIPERACILLIN AND TAZOBACTAM 25 GRAM(S): 4; .5 INJECTION, POWDER, LYOPHILIZED, FOR SOLUTION INTRAVENOUS at 17:32

## 2021-02-21 RX ADMIN — Medication 40 MILLIEQUIVALENT(S): at 12:22

## 2021-02-21 RX ADMIN — PIPERACILLIN AND TAZOBACTAM 25 GRAM(S): 4; .5 INJECTION, POWDER, LYOPHILIZED, FOR SOLUTION INTRAVENOUS at 08:30

## 2021-02-21 RX ADMIN — MORPHINE SULFATE 2 MILLIGRAM(S): 50 CAPSULE, EXTENDED RELEASE ORAL at 00:24

## 2021-02-21 RX ADMIN — Medication 100 MILLIEQUIVALENT(S): at 09:34

## 2021-02-21 NOTE — PROGRESS NOTE ADULT - SUBJECTIVE AND OBJECTIVE BOX
SURGERY PROGRESS HPI:  Pt seen and examined at bedside. L-sided abdominal pain is similar to yesterday, well controlled on pain medication. No acute events overnight. Pt tolerating ice chips. Pt denies nausea and vomiting. Passing flatus. No BM overnight. Voiding well. Pt denies chest pain, SOB, dizziness, fever, chills. Ambulating.      Vital Signs Last 24 Hrs  T(C): 37.2 (21 Feb 2021 05:20), Max: 38.8 (20 Feb 2021 17:46)  T(F): 98.9 (21 Feb 2021 05:20), Max: 101.8 (20 Feb 2021 17:46)  HR: 93 (21 Feb 2021 05:20) (80 - 99)  BP: 144/84 (21 Feb 2021 05:20) (144/84 - 158/91)  BP(mean): --  RR: 18 (21 Feb 2021 05:20) (17 - 18)  SpO2: 95% (21 Feb 2021 05:20) (95% - 96%)      PHYSICAL EXAM:  GENERAL: NAD  CHEST/LUNG: Clear to ausculation, bilaterally   HEART: RRR S1S2  ABDOMEN: Obese, non distended, +BS, soft, left-sided tenderness, no guarding, no rigidity   EXTREMITIES:  calf soft, non tender b/l      I&O's Detail    19 Feb 2021 07:01  -  20 Feb 2021 07:00  --------------------------------------------------------  IN:    IV PiggyBack: 100 mL    IV PiggyBack: 200 mL    Lactated Ringers: 2000 mL    Oral Fluid: 240 mL  Total IN: 2540 mL    OUT:    Voided (mL): 1400 mL  Total OUT: 1400 mL    Total NET: 1140 mL      20 Feb 2021 07:01  -  21 Feb 2021 06:09  --------------------------------------------------------  IN:    IV PiggyBack: 100 mL    IV PiggyBack: 100 mL    Lactated Ringers: 2040 mL  Total IN: 2240 mL    OUT:    Oral Fluid: 0 mL    Voided (mL): 1950 mL  Total OUT: 1950 mL    Total NET: 290 mL      LABS:                        12.6   23.57 )-----------( 349      ( 20 Feb 2021 07:30 )             39.1     02-20    137  |  103  |  8   ----------------------------<  68<L>  3.4<L>   |  25  |  0.94    Ca    8.6      20 Feb 2021 07:30  Phos  2.0     02-20  Mg     2.1     02-20      Culture Results:   No growth to date. (02-18 @ 23:01)  Culture Results:   No growth to date. (02-18 @ 23:01)  Culture Results:   <10,000 CFU/mL Normal Urogenital Loulou (02-18 @ 22:56)    < from: Xray Chest 1 View-PORTABLE IMMEDIATE (Xray Chest 1 View-PORTABLE IMMEDIATE .) (02.20.21 @ 12:16) >  IMPRESSION: No acute finding.  < end of copied text >      Assessment: 40M PMHx colonoscopy and polypectomy approximately 6 months ago, recent repeat colonoscopy and ?polypectomy admitted with colitis and perisigmoid fluid collection 1.5 x 1.2 x 3.8 cm.   Colonoscopy reports: Patient has history of diverticulosis noted on 6/3/20 report. He has had two polyps 4.9 cm and 0.9 cm removed resulting tubulovillous adenoma negative for high grade dysplasia or malignancy and polypoid colonic mucosa negative for adenoma / malignancy respectively on 6/3/20. On 2/9/21 he had repeat colonoscopy and biopsy of rectum hyperplastic polyp 0.2cm negative for adenoma or malignancy. No diverticulosis noted on this colonoscopy.  No drainable collection per IR. Tmax yesterday: 101.8.    Plan:  -Continue Zosyn  -Possible CT today or tomorrow   -NPO with ice chips, IV hydration  -pain management prn  -continue DVT prophylaxis, OOB, Ambulating   -f/u AM labs. Trend WBC.  -Will d/w attending

## 2021-02-21 NOTE — DIETITIAN INITIAL EVALUATION ADULT. - OTHER INFO
Pt adm w/colitis w/pericolic collection. Pt s/p colonoscopy last week (PTA) for re-evaluation for new polyps. Met w/pt at bedside, pt reports mild nausea this morning, RN aware of same. Pt denies vomiting/diarrhea/constipation. Pt reports not eating "much" on the day of admission, however, prior to that pt reports eating well. PTA pt was not following any special diet/dietary restrictions. Pt states he and his girlfriend do food-shopping & cooking together. Pt reports UBW of ~285# and states he may have gained some weight during COVID pandemic. Per chart pt NPO for bowel rest, possible CT in 1-2 days. Discussed healthy eating habits, Soft, low residue diet and compliance encouraged. Pt appeared distracted during interview.

## 2021-02-21 NOTE — DIETITIAN INITIAL EVALUATION ADULT. - PERTINENT MEDS FT
MEDICATIONS  (STANDING):  dextrose 5% + sodium chloride 0.9% with potassium chloride 20 mEq/L 1000 milliLiter(s) (150 mL/Hr) IV Continuous <Continuous>  enoxaparin Injectable 40 milliGRAM(s) SubCutaneous daily  ketorolac   Injectable 30 milliGRAM(s) IV Push every 6 hours  piperacillin/tazobactam IVPB.. 3.375 Gram(s) IV Intermittent every 8 hours  potassium chloride   Powder 40 milliEquivalent(s) Oral once  potassium phosphate IVPB 15 milliMole(s) IV Intermittent once    MEDICATIONS  (PRN):  morphine  - Injectable 4 milliGRAM(s) IV Push every 4 hours PRN Severe Pain (7 - 10)  morphine  - Injectable 2 milliGRAM(s) IV Push every 4 hours PRN Moderate Pain (4 - 6)  ondansetron Injectable 4 milliGRAM(s) IV Push every 6 hours PRN Nausea and/or Vomiting

## 2021-02-21 NOTE — DIETITIAN INITIAL EVALUATION ADULT. - PERTINENT LABORATORY DATA
02-21 Na139 mmol/L Glu 59 mg/dL<L> K+ 3.4 mmol/L<L> Cr  0.90 mg/dL BUN 8 mg/dL 02-21 Phos 2.3 mg/dL<L> 02-18 Alb 3.3 g/dL

## 2021-02-21 NOTE — DIETITIAN INITIAL EVALUATION ADULT. - ADD RECOMMEND
If/when medically feasible initiate Clear Fluids. Gradually advance, as tolerated, to Soft, Fiber/Residue restricted.

## 2021-02-22 LAB
ANION GAP SERPL CALC-SCNC: 11 MMOL/L — SIGNIFICANT CHANGE UP (ref 5–17)
BUN SERPL-MCNC: 7 MG/DL — SIGNIFICANT CHANGE UP (ref 7–23)
CALCIUM SERPL-MCNC: 8.9 MG/DL — SIGNIFICANT CHANGE UP (ref 8.5–10.1)
CHLORIDE SERPL-SCNC: 107 MMOL/L — SIGNIFICANT CHANGE UP (ref 96–108)
CO2 SERPL-SCNC: 23 MMOL/L — SIGNIFICANT CHANGE UP (ref 22–31)
CREAT SERPL-MCNC: 0.95 MG/DL — SIGNIFICANT CHANGE UP (ref 0.5–1.3)
GLUCOSE SERPL-MCNC: 83 MG/DL — SIGNIFICANT CHANGE UP (ref 70–99)
HCT VFR BLD CALC: 37.5 % — LOW (ref 39–50)
HGB BLD-MCNC: 12.4 G/DL — LOW (ref 13–17)
MAGNESIUM SERPL-MCNC: 2.2 MG/DL — SIGNIFICANT CHANGE UP (ref 1.6–2.6)
MCHC RBC-ENTMCNC: 27 PG — SIGNIFICANT CHANGE UP (ref 27–34)
MCHC RBC-ENTMCNC: 33.1 GM/DL — SIGNIFICANT CHANGE UP (ref 32–36)
MCV RBC AUTO: 81.7 FL — SIGNIFICANT CHANGE UP (ref 80–100)
NRBC # BLD: 0 /100 WBCS — SIGNIFICANT CHANGE UP (ref 0–0)
PHOSPHATE SERPL-MCNC: 2.7 MG/DL — SIGNIFICANT CHANGE UP (ref 2.5–4.5)
PLATELET # BLD AUTO: 419 K/UL — HIGH (ref 150–400)
POTASSIUM SERPL-MCNC: 3.9 MMOL/L — SIGNIFICANT CHANGE UP (ref 3.5–5.3)
POTASSIUM SERPL-SCNC: 3.9 MMOL/L — SIGNIFICANT CHANGE UP (ref 3.5–5.3)
RBC # BLD: 4.59 M/UL — SIGNIFICANT CHANGE UP (ref 4.2–5.8)
RBC # FLD: 13.8 % — SIGNIFICANT CHANGE UP (ref 10.3–14.5)
SODIUM SERPL-SCNC: 141 MMOL/L — SIGNIFICANT CHANGE UP (ref 135–145)
WBC # BLD: 18.31 K/UL — HIGH (ref 3.8–10.5)
WBC # FLD AUTO: 18.31 K/UL — HIGH (ref 3.8–10.5)

## 2021-02-22 RX ORDER — SIMETHICONE 80 MG/1
80 TABLET, CHEWABLE ORAL ONCE
Refills: 0 | Status: COMPLETED | OUTPATIENT
Start: 2021-02-22 | End: 2021-02-22

## 2021-02-22 RX ADMIN — DEXTROSE MONOHYDRATE, SODIUM CHLORIDE, AND POTASSIUM CHLORIDE 100 MILLILITER(S): 50; .745; 4.5 INJECTION, SOLUTION INTRAVENOUS at 19:34

## 2021-02-22 RX ADMIN — PIPERACILLIN AND TAZOBACTAM 25 GRAM(S): 4; .5 INJECTION, POWDER, LYOPHILIZED, FOR SOLUTION INTRAVENOUS at 17:40

## 2021-02-22 RX ADMIN — PIPERACILLIN AND TAZOBACTAM 25 GRAM(S): 4; .5 INJECTION, POWDER, LYOPHILIZED, FOR SOLUTION INTRAVENOUS at 09:20

## 2021-02-22 RX ADMIN — SIMETHICONE 80 MILLIGRAM(S): 80 TABLET, CHEWABLE ORAL at 21:28

## 2021-02-22 RX ADMIN — PIPERACILLIN AND TAZOBACTAM 25 GRAM(S): 4; .5 INJECTION, POWDER, LYOPHILIZED, FOR SOLUTION INTRAVENOUS at 00:39

## 2021-02-22 RX ADMIN — MORPHINE SULFATE 2 MILLIGRAM(S): 50 CAPSULE, EXTENDED RELEASE ORAL at 17:40

## 2021-02-22 RX ADMIN — ENOXAPARIN SODIUM 40 MILLIGRAM(S): 100 INJECTION SUBCUTANEOUS at 11:31

## 2021-02-22 RX ADMIN — MORPHINE SULFATE 2 MILLIGRAM(S): 50 CAPSULE, EXTENDED RELEASE ORAL at 06:02

## 2021-02-22 RX ADMIN — MORPHINE SULFATE 4 MILLIGRAM(S): 50 CAPSULE, EXTENDED RELEASE ORAL at 00:44

## 2021-02-22 RX ADMIN — DEXTROSE MONOHYDRATE, SODIUM CHLORIDE, AND POTASSIUM CHLORIDE 150 MILLILITER(S): 50; .745; 4.5 INJECTION, SOLUTION INTRAVENOUS at 00:46

## 2021-02-22 NOTE — PROGRESS NOTE ADULT - SUBJECTIVE AND OBJECTIVE BOX
Patient seen and examined bedside, sitting in chair , c/o mild throbbing LLQ abdominal pain that is relieved with pain meds, also c/o bloating. Afebrile. Denies ambulating. voids without any issues.   +flatus, no BM. Tolerating icechips, denies nausea and vomiting. Denies chest pain, dyspnea, cough.    T(F): 98.3 (02-22-21 @ 05:31), Max: 99 (02-21-21 @ 23:51)  HR: 85 (02-22-21 @ 05:31) (85 - 96)  BP: 145/84 (02-22-21 @ 05:31) (127/81 - 153/91)  RR: 18 (02-22-21 @ 05:31) (17 - 18)  SpO2: 95% (02-22-21 @ 05:31) (95% - 97%)  Wt(kg): --  CAPILLARY BLOOD GLUCOSE      PHYSICAL EXAM:  GENERAL: NAD  CHEST/LUNG: Clear to ausculation, bilaterally   HEART: RRR S1S2  ABDOMEN: Obese, non distended, +BS, soft, ttp in LLQ, no guarding, no rigidity   EXTREMITIES:  calf soft, non tender b/l    LABS: pending        I&O's Detail    20 Feb 2021 07:01  -  21 Feb 2021 07:00  --------------------------------------------------------  IN:    IV PiggyBack: 100 mL    IV PiggyBack: 100 mL    Lactated Ringers: 2040 mL  Total IN: 2240 mL    OUT:    Oral Fluid: 0 mL    Voided (mL): 2650 mL  Total OUT: 2650 mL    Total NET: -410 mL      21 Feb 2021 07:01  -  22 Feb 2021 06:46  --------------------------------------------------------  IN:    dextrose 5% + sodium chloride 0.9% w/ Additives: 2800 mL    IV PiggyBack: 250 mL    IV PiggyBack: 300 mL  Total IN: 3350 mL    OUT:    Oral Fluid: 0 mL    Voided (mL): 500 mL  Total OUT: 500 mL    Total NET: 2850 mL      Culture Results:   No growth to date. (02-18 @ 23:01)  Culture Results:   No growth to date. (02-18 @ 23:01)  Culture Results:   <10,000 CFU/mL Normal Urogenital Loulou (02-18 @ 22:56)    < from: CT Abdomen and Pelvis w/ Oral Cont and w/ IV Cont (02.21.21 @ 16:51) >  FINDINGS:  LOWER CHEST: Small left pleural effusion, new since 2/18/2021. Left basilar atelectasis, new since the prior study.    LIVER: Within normal limits.  BILE DUCTS: Normal caliber.  GALLBLADDER: Within normal limits.  SPLEEN: Within normal limits.  PANCREAS: Within normal limits.  ADRENALS: Within normal limits.  KIDNEYS/URETERS: Within normal limits.    BLADDER: Within normal limits.  REPRODUCTIVE ORGANS: Prostate within normal limits.    BOWEL: No bowel obstruction. Appendix is normal. Diffuse thickening of the wall of the descending colon consistent with colitis. Inflammatory changes predominantly surrounding the distal descending colon. The small pericolonic fluid collection noted on 2/18/2021 is no longer demonstrated. No evidence of abscess. There is a persistent pericolonic phlegmon, smaller since the prior exam.  PERITONEUM: Small volume of pelvic ascites, a new finding.  VESSELS: Within normal limits.  RETROPERITONEUM/LYMPH NODES: No lymphadenopathy.  ABDOMINAL WALL: Small umbilical hernia.  BONES: Within normal limits.    IMPRESSION:  Persistent colitis involvingthe descending colon. Resolution of small pericolonic fluid collection since 2/18/2021. Persistent phlegmon, smaller since 2/18/2021.    < end of copied text >      Assessment: 40M PMHx colonoscopy and polypectomy approximately 6 months ago, recent repeat colonoscopy and ?polypectomy admitted with colitis and perisigmoid fluid collection 1.5 x 1.2 x 3.8 cm.   Colonoscopy reports: Patient has history of diverticulosis noted on 6/3/20 report. He has had two polyps 4.9 cm and 0.9 cm removed resulting tubulovillous adenoma negative for high grade dysplasia or malignancy and polypoid colonic mucosa negative for adenoma / malignancy respectively on 6/3/20. On 2/9/21 he had repeat colonoscopy and biopsy of rectum hyperplastic polyp 0.2cm negative for adenoma or malignancy. No diverticulosis noted on this colonoscopy.  No drainable collection per IR.    Plan:  -Continue Zosyn  - possible ID consult if persistent or increasing Leucocytosis  - NPO with ice chips, IV hydration, possibly advance to clears today  - pain management prn  - continue DVT prophylaxis, OOB, Ambulating   - f/u AM labs, replete lytes prn. Trend WBC.  - Will d/w attending Patient seen and examined bedside, sitting in chair , c/o mild throbbing LLQ abdominal pain that is relieved with pain meds, also c/o bloating. Afebrile. Denies ambulating. voids without any issues.   +flatus, + BM. Tolerating icechips, denies nausea and vomiting. Denies chest pain, dyspnea, cough.    T(F): 98.3 (02-22-21 @ 05:31), Max: 99 (02-21-21 @ 23:51)  HR: 85 (02-22-21 @ 05:31) (85 - 96)  BP: 145/84 (02-22-21 @ 05:31) (127/81 - 153/91)  RR: 18 (02-22-21 @ 05:31) (17 - 18)  SpO2: 95% (02-22-21 @ 05:31) (95% - 97%)  Wt(kg): --  CAPILLARY BLOOD GLUCOSE      PHYSICAL EXAM:  GENERAL: NAD  CHEST/LUNG: Clear to ausculation, bilaterally   HEART: RRR S1S2  ABDOMEN: Obese, non distended, +BS, soft, ttp in LLQ, no guarding, no rigidity   EXTREMITIES:  calf soft, non tender b/l    LABS: pending        I&O's Detail    20 Feb 2021 07:01  -  21 Feb 2021 07:00  --------------------------------------------------------  IN:    IV PiggyBack: 100 mL    IV PiggyBack: 100 mL    Lactated Ringers: 2040 mL  Total IN: 2240 mL    OUT:    Oral Fluid: 0 mL    Voided (mL): 2650 mL  Total OUT: 2650 mL    Total NET: -410 mL      21 Feb 2021 07:01  -  22 Feb 2021 06:46  --------------------------------------------------------  IN:    dextrose 5% + sodium chloride 0.9% w/ Additives: 2800 mL    IV PiggyBack: 250 mL    IV PiggyBack: 300 mL  Total IN: 3350 mL    OUT:    Oral Fluid: 0 mL    Voided (mL): 500 mL  Total OUT: 500 mL    Total NET: 2850 mL      Culture Results:   No growth to date. (02-18 @ 23:01)  Culture Results:   No growth to date. (02-18 @ 23:01)  Culture Results:   <10,000 CFU/mL Normal Urogenital Loulou (02-18 @ 22:56)    < from: CT Abdomen and Pelvis w/ Oral Cont and w/ IV Cont (02.21.21 @ 16:51) >  FINDINGS:  LOWER CHEST: Small left pleural effusion, new since 2/18/2021. Left basilar atelectasis, new since the prior study.    LIVER: Within normal limits.  BILE DUCTS: Normal caliber.  GALLBLADDER: Within normal limits.  SPLEEN: Within normal limits.  PANCREAS: Within normal limits.  ADRENALS: Within normal limits.  KIDNEYS/URETERS: Within normal limits.    BLADDER: Within normal limits.  REPRODUCTIVE ORGANS: Prostate within normal limits.    BOWEL: No bowel obstruction. Appendix is normal. Diffuse thickening of the wall of the descending colon consistent with colitis. Inflammatory changes predominantly surrounding the distal descending colon. The small pericolonic fluid collection noted on 2/18/2021 is no longer demonstrated. No evidence of abscess. There is a persistent pericolonic phlegmon, smaller since the prior exam.  PERITONEUM: Small volume of pelvic ascites, a new finding.  VESSELS: Within normal limits.  RETROPERITONEUM/LYMPH NODES: No lymphadenopathy.  ABDOMINAL WALL: Small umbilical hernia.  BONES: Within normal limits.    IMPRESSION:  Persistent colitis involvingthe descending colon. Resolution of small pericolonic fluid collection since 2/18/2021. Persistent phlegmon, smaller since 2/18/2021.    < end of copied text >      Assessment: 40M PMHx colonoscopy and polypectomy approximately 6 months ago, recent repeat colonoscopy and ?polypectomy admitted with colitis and perisigmoid fluid collection 1.5 x 1.2 x 3.8 cm.   Colonoscopy reports: Patient has history of diverticulosis noted on 6/3/20 report. He has had two polyps 4.9 cm and 0.9 cm removed resulting tubulovillous adenoma negative for high grade dysplasia or malignancy and polypoid colonic mucosa negative for adenoma / malignancy respectively on 6/3/20. On 2/9/21 he had repeat colonoscopy and biopsy of rectum hyperplastic polyp 0.2cm negative for adenoma or malignancy. No diverticulosis noted on this colonoscopy.  No drainable collection per IR.    Plan:  -Continue Zosyn  - possible ID consult if persistent or increasing Leucocytosis  - NPO with ice chips, IV hydration, possibly advance to clears today  - pain management prn  - continue DVT prophylaxis, OOB, Ambulating   - f/u AM labs, replete lytes prn. Trend WBC.  - Will d/w attending

## 2021-02-23 LAB
ANION GAP SERPL CALC-SCNC: 4 MMOL/L — LOW (ref 5–17)
BUN SERPL-MCNC: 5 MG/DL — LOW (ref 7–23)
CALCIUM SERPL-MCNC: 8.5 MG/DL — SIGNIFICANT CHANGE UP (ref 8.5–10.1)
CHLORIDE SERPL-SCNC: 108 MMOL/L — SIGNIFICANT CHANGE UP (ref 96–108)
CO2 SERPL-SCNC: 29 MMOL/L — SIGNIFICANT CHANGE UP (ref 22–31)
CREAT SERPL-MCNC: 1 MG/DL — SIGNIFICANT CHANGE UP (ref 0.5–1.3)
GLUCOSE SERPL-MCNC: 90 MG/DL — SIGNIFICANT CHANGE UP (ref 70–99)
HCT VFR BLD CALC: 36.1 % — LOW (ref 39–50)
HGB BLD-MCNC: 11.8 G/DL — LOW (ref 13–17)
MAGNESIUM SERPL-MCNC: 2.2 MG/DL — SIGNIFICANT CHANGE UP (ref 1.6–2.6)
MCHC RBC-ENTMCNC: 27.1 PG — SIGNIFICANT CHANGE UP (ref 27–34)
MCHC RBC-ENTMCNC: 32.7 GM/DL — SIGNIFICANT CHANGE UP (ref 32–36)
MCV RBC AUTO: 82.8 FL — SIGNIFICANT CHANGE UP (ref 80–100)
NRBC # BLD: 0 /100 WBCS — SIGNIFICANT CHANGE UP (ref 0–0)
PHOSPHATE SERPL-MCNC: 3.2 MG/DL — SIGNIFICANT CHANGE UP (ref 2.5–4.5)
PLATELET # BLD AUTO: 432 K/UL — HIGH (ref 150–400)
POTASSIUM SERPL-MCNC: 3.8 MMOL/L — SIGNIFICANT CHANGE UP (ref 3.5–5.3)
POTASSIUM SERPL-SCNC: 3.8 MMOL/L — SIGNIFICANT CHANGE UP (ref 3.5–5.3)
RBC # BLD: 4.36 M/UL — SIGNIFICANT CHANGE UP (ref 4.2–5.8)
RBC # FLD: 14.1 % — SIGNIFICANT CHANGE UP (ref 10.3–14.5)
SODIUM SERPL-SCNC: 141 MMOL/L — SIGNIFICANT CHANGE UP (ref 135–145)
WBC # BLD: 17.03 K/UL — HIGH (ref 3.8–10.5)
WBC # FLD AUTO: 17.03 K/UL — HIGH (ref 3.8–10.5)

## 2021-02-23 PROCEDURE — 99223 1ST HOSP IP/OBS HIGH 75: CPT

## 2021-02-23 RX ORDER — LANOLIN ALCOHOL/MO/W.PET/CERES
3 CREAM (GRAM) TOPICAL AT BEDTIME
Refills: 0 | Status: DISCONTINUED | OUTPATIENT
Start: 2021-02-23 | End: 2021-02-26

## 2021-02-23 RX ORDER — MORPHINE SULFATE 50 MG/1
2 CAPSULE, EXTENDED RELEASE ORAL ONCE
Refills: 0 | Status: DISCONTINUED | OUTPATIENT
Start: 2021-02-23 | End: 2021-02-23

## 2021-02-23 RX ORDER — OXYCODONE HYDROCHLORIDE 5 MG/1
5 TABLET ORAL EVERY 4 HOURS
Refills: 0 | Status: DISCONTINUED | OUTPATIENT
Start: 2021-02-23 | End: 2021-02-26

## 2021-02-23 RX ORDER — ACETAMINOPHEN 500 MG
650 TABLET ORAL EVERY 6 HOURS
Refills: 0 | Status: DISCONTINUED | OUTPATIENT
Start: 2021-02-23 | End: 2021-02-26

## 2021-02-23 RX ADMIN — PIPERACILLIN AND TAZOBACTAM 25 GRAM(S): 4; .5 INJECTION, POWDER, LYOPHILIZED, FOR SOLUTION INTRAVENOUS at 00:44

## 2021-02-23 RX ADMIN — Medication 3 MILLIGRAM(S): at 21:51

## 2021-02-23 RX ADMIN — PIPERACILLIN AND TAZOBACTAM 25 GRAM(S): 4; .5 INJECTION, POWDER, LYOPHILIZED, FOR SOLUTION INTRAVENOUS at 09:10

## 2021-02-23 RX ADMIN — OXYCODONE HYDROCHLORIDE 5 MILLIGRAM(S): 5 TABLET ORAL at 22:43

## 2021-02-23 RX ADMIN — MORPHINE SULFATE 2 MILLIGRAM(S): 50 CAPSULE, EXTENDED RELEASE ORAL at 23:38

## 2021-02-23 RX ADMIN — MORPHINE SULFATE 2 MILLIGRAM(S): 50 CAPSULE, EXTENDED RELEASE ORAL at 00:44

## 2021-02-23 RX ADMIN — ENOXAPARIN SODIUM 40 MILLIGRAM(S): 100 INJECTION SUBCUTANEOUS at 12:12

## 2021-02-23 RX ADMIN — PIPERACILLIN AND TAZOBACTAM 25 GRAM(S): 4; .5 INJECTION, POWDER, LYOPHILIZED, FOR SOLUTION INTRAVENOUS at 16:45

## 2021-02-23 RX ADMIN — MORPHINE SULFATE 2 MILLIGRAM(S): 50 CAPSULE, EXTENDED RELEASE ORAL at 05:27

## 2021-02-23 NOTE — CONSULT NOTE ADULT - ATTENDING COMMENTS
Discussed with Roxanna Arango NP. I have personally seen, examined and participated in the care of this patient. I have reviewed all pertinent clinical information, including history, physical exam, plan and the NP's note and agree. Note has been reviewed and made any necessary modifications.     may be able to d/c on PO antibiotics after improves on IV and able to advance diet  For now continue (Zosyn) Piperacillin/tazobactam 3.375 grams every 8 hours   pain control  advance diet     Derian Hallman DO  Cell: [phone]  Pager [phone]  Infectious Disease Attending  After 5pm/weekends please call [phone] for all inquiries and new consults

## 2021-02-23 NOTE — CONSULT NOTE ADULT - SUBJECTIVE AND OBJECTIVE BOX
Patient is a 40y old  Male who presents with a chief complaint of Colitis, with pericolic collection (23 Feb 2021 09:21)      HPI:  41 YO M with a sig PMHx of colonoscopy with polyp, with no sig PSHx. Patient presents with severe 10/10 sharp pain in the lower abdomen, non-radiating. He took Motrin, which did not help. Patient had a colonoscopy last week Teusday, for re-evaluation for new polyps. Patient states he last had a normal BM yesterday, and a small BM this AM. Patient admits to occasional cigarette smoking, and ETOH on weekends. Denies CP, SOB, palpitations, nausea or vomiting. (18 Feb 2021 18:56)      ID consulted for workup and antibiotic management     PAST MEDICAL & SURGICAL HISTORY:  H/O colonoscopy with polypectomy    No significant past surgical history        Allergies  No Known Allergies        ANTIMICROBIALS:  piperacillin/tazobactam IVPB.. 3.375 every 8 hours      MEDICATIONS  (STANDING):  piperacillin/tazobactam IVPB..   25 mL/Hr IV Intermittent (02-23-21 @ 09:10)   25 mL/Hr IV Intermittent (02-23-21 @ 00:44)   25 mL/Hr IV Intermittent (02-22-21 @ 17:40)   25 mL/Hr IV Intermittent (02-22-21 @ 09:20)   25 mL/Hr IV Intermittent (02-22-21 @ 00:39)   25 mL/Hr IV Intermittent (02-21-21 @ 17:32)   25 mL/Hr IV Intermittent (02-21-21 @ 08:30)   25 mL/Hr IV Intermittent (02-20-21 @ 23:07)   25 mL/Hr IV Intermittent (02-20-21 @ 16:44)   25 mL/Hr IV Intermittent (02-20-21 @ 08:28)   25 mL/Hr IV Intermittent (02-20-21 @ 00:44)   25 mL/Hr IV Intermittent (02-19-21 @ 18:13)   25 mL/Hr IV Intermittent (02-19-21 @ 08:39)   25 mL/Hr IV Intermittent (02-19-21 @ 00:51)    piperacillin/tazobactam IVPB...   200 mL/Hr IV Intermittent (02-18-21 @ 18:11)    vancomycin  IVPB.   250 mL/Hr IV Intermittent (02-18-21 @ 18:11)        OTHER MEDS: MEDICATIONS  (STANDING):  acetaminophen   Tablet .. 650 every 6 hours PRN  enoxaparin Injectable 40 daily  oxyCODONE    IR 5 every 4 hours PRN      SOCIAL HISTORY:     smokes 2-3 cigarettes a week for the last two years  alcohol once a week  denies drug abuse  lives with his grandmother      FAMILY HISTORY:  Father - heart disease  Mother - DM, Asthma, diverticulosis  aunt - lymphoma  Grandmother - lung ca      REVIEW OF SYSTEMS  [  ] ROS unobtainable because:    [  ] All other systems negative except as noted below:	    Constitutional:  [ ] fever [ ] chills  [ ] weight loss  [ ] weakness  Skin:  [ ] rash [ ] phlebitis	  Eyes: [ ] icterus [ ] pain  [ ] discharge	  ENMT: [ ] sore throat  [ ] thrush [ ] ulcers [ ] exudates  Respiratory: [ ] dyspnea [ ] hemoptysis [ ] cough [ ] sputum	  Cardiovascular:  [ ] chest pain [ ] palpitations [ ] edema	  Gastrointestinal:  [ x] nausea a couple days ago, no vomiting  [ ] vomiting  [ ] diarrhea [ ] constipation [x ] pain, + passes flatus, last BM yesterday  Genitourinary:  [ ] dysuria [ ] frequency [ ] hematuria [ ] discharge [ ] flank pain  [ ] incontinence  Musculoskeletal:  [ ] myalgias [ ] arthralgias [ ] arthritis  [ ] back pain  Neurological:  [ ] headache [ ] seizures  [ ] confusion/altered mental status  Psychiatric:  [ ] anxiety [ ] depression	  Hematology/Lymphatics:  [ ] lymphadenopathy  Endocrine:  [ ] adrenal [ ] thyroid  Allergic/Immunologic:	 [ ] transplant [ ] seasonal    Vital Signs Last 24 Hrs  T(F): 98.2 (02-23-21 @ 05:18), Max: 101.8 (02-20-21 @ 17:46)    Vital Signs Last 24 Hrs  HR: 83 (02-23-21 @ 05:18) (80 - 83)  BP: 151/95 (02-23-21 @ 05:18) (151/87 - 161/81)  RR: 18 (02-23-21 @ 05:18)  SpO2: 95% (02-23-21 @ 05:18) (95% - 97%)  Wt(kg): --    PHYSICAL EXAM:  Constitutional: non-toxic, no distress, obese  HEAD/EYES: anicteric, no conjunctival injection  ENT:  supple, mild thrush  Cardiovascular:   normal S1, S2, no murmur, mild edema of bilateral lower extremities   Respiratory:  clear BS bilaterally, no wheezes, no rales  GI:  soft, bowel sounds are hypoactive, not distended, diffuse mild tenderness of upper abdomen and LLQ  :  no robison, no CVA tenderness  Musculoskeletal:  no synovitis, normal ROM  Neurologic: awake and alert, normal strength, no focal findings  Skin:  no rash, no erythema, no phlebitis  Heme/Onc: no lymphadenopathy   Psychiatric:  awake, alert, appropriate mood          WBC Count: 17.03 K/uL (02-23 @ 06:41)  WBC Count: 18.31 K/uL (02-22 @ 07:29)  WBC Count: 24.40 K/uL (02-21 @ 08:05)  WBC Count: 23.57 K/uL (02-20 @ 07:30)  WBC Count: 21.29 K/uL (02-19 @ 07:54)  WBC Count: 20.24 K/uL (02-18 @ 15:49)                            11.8   17.03 )-----------( 432      ( 23 Feb 2021 06:41 )             36.1       02-23    141  |  108  |  5<L>  ----------------------------<  90  3.8   |  29  |  1.00    Ca    8.5      23 Feb 2021 06:41  Phos  3.2     02-23  Mg     2.2     02-23        Creatinine Trend: 1.00<--, 0.95<--, 0.90<--, 0.94<--, 1.05<--, 1.10<--        MICROBIOLOGY:    Culture - Blood (collected 02-21-21 @ 10:51)  Source: .Blood Blood-Peripheral  Preliminary Report (02-22-21 @ 11:01):    No growth to date.    Culture - Blood (collected 02-21-21 @ 10:51)  Source: .Blood Blood-Peripheral  Preliminary Report (02-22-21 @ 11:01):    No growth to date.            v  Flu With COVID-19 By NATALIE (02.18.21 @ 17:49)    SARS-CoV-2 Result: NotDetec    Influenza A Result: NotDetec: EUA/IVD    Influenza B Result: NotDetec: EUA/IVD            RADIOLOGY:    < from: CT Abdomen and Pelvis w/ Oral Cont and w/ IV Cont (02.21.21 @ 16:51) >    EXAM:  CT ABDOMEN AND PELVIS OC IC                            PROCEDURE DATE:  02/21/2021          INTERPRETATION:  CLINICAL INFORMATION: Colitis. Follow-up exam.    COMPARISON: 2/18/2021    PROCEDURE:  CT of the Abdomen and Pelvis was performed with intravenous contrast.  Intravenous contrast: 90 ml Omnipaque 350. 10 ml discarded.  Oral contrast: None.  Sagittal and coronal reformats were performed.    FINDINGS:  LOWER CHEST: Small left pleural effusion, new since 2/18/2021. Left basilar atelectasis, new since the prior study.    LIVER: Within normal limits.  BILE DUCTS: Normal caliber.  GALLBLADDER: Within normal limits.  SPLEEN: Within normal limits.  PANCREAS: Within normal limits.  ADRENALS: Within normal limits.  KIDNEYS/URETERS: Within normal limits.    BLADDER: Within normal limits.  REPRODUCTIVE ORGANS: Prostate within normal limits.    BOWEL: No bowel obstruction. Appendix is normal. Diffuse thickening of the wall of the descending colon consistent with colitis. Inflammatory changes predominantly surrounding the distal descending colon. The small pericolonic fluid collection noted on 2/18/2021 is no longer demonstrated. No evidence of abscess. There is a persistent pericolonic phlegmon, smaller since the prior exam.  PERITONEUM: Small volume of pelvic ascites, a new finding.  VESSELS: Within normal limits.  RETROPERITONEUM/LYMPH NODES: No lymphadenopathy.  ABDOMINAL WALL: Small umbilical hernia.  BONES: Within normal limits.    IMPRESSION:  Persistent colitis involvingthe descending colon. Resolution of small pericolonic fluid collection since 2/18/2021. Persistent phlegmon, smaller since 2/18/2021.    KRAIG ORTIZ MD; Attending Radiologist  This document has been electronically signed. Feb 21 2021  5:07PM    < end of copied text >    < from: CT Abdomen and Pelvis w/ IV Cont (02.18.21 @ 16:25) >    EXAM:  CT ABDOMEN AND PELVIS IC                            PROCEDURE DATE:  02/18/2021          INTERPRETATION:  CLINICAL INFORMATION: Left lower quadrant abdominal pain.    COMPARISON: None.    PROCEDURE:  CT of the Abdomen and Pelvis was performed with intravenous contrast.  Intravenous contrast: 90 ml Omnipaque 350. 10 ml discarded.  Oral contrast: None.  Sagittal and coronal reformats were performed.    FINDINGS:  LOWER CHEST: A couple subcentimeter nodules in the right middle and lower lobes measuring 0.3 cm in the right middle lobe (series 2, image 4) and 0.2 cm in the right lower lobe (series 2, image 3), nonspecific.    LIVER: Within normal limits.  BILE DUCTS: Normal caliber.  GALLBLADDER: Within normal limits.  SPLEEN: Within normal limits.  PANCREAS: Within normal limits.  ADRENALS: Within normal limits.  KIDNEYS/URETERS: Within normal limits.    BLADDER: Within normal limits.  REPRODUCTIVE ORGANS: Normal size prostate.    BOWEL/PERITONEUM: There is long segment wall thickening of the proximal sigmoid colon with marked pericolic inflammatory change including a small pericolic collection measuring 1.5 x 1.2 x 3.8 cm (series 2, image 121). Question a single inflamed diverticulum at the level of the pericolic collection. However, there is no significant colonic diverticulitis. Trace regional ascites. No bowel obstruction. Appendix is normal.    VESSELS: Patent portal veins.  RETROPERITONEUM/LYMPH NODES: No lymphadenopathy.  ABDOMINAL WALL: A tiny fat-containing umbilicalhernia.  BONES: Within normal limits.    IMPRESSION:  Long segment wall thickening of the proximal sigmoid colon with a small pericolic collection. Question a single inflamed diverticulum at the level of the pericolic collection, however, there is nosignificant colonic diverticulosis. Differential includes acute sigmoid diverticulitis and colitis. After resolution of symptomatology, recommend colonoscopy.      KATI TRUONG M.D., ATTENDING RADIOLOGIST  This document has been electronically signed. Feb 18 2021  4:46PM    < end of copied text >      < from: Xray Chest 1 View-PORTABLE IMMEDIATE (Xray Chest 1 View-PORTABLE IMMEDIATE .) (02.20.21 @ 12:16) >    EXAM:  XR CHEST PORTABLE IMMED 1V                            PROCEDURE DATE:  02/20/2021          INTERPRETATION:  AP semierect chest on February 20, 2021 at 11:23 AM. Patient has leukocytosis.    CAT scan of February 18 showed pericolonic sigmoid abscess.    COMPARISON: None available.    Shallow inspiration crowds the chest.    Heart magnified by technique.    Lungs clear.    IMPRESSION: No acute finding.            BRAD CASTRO MD; Attending Radiologist  This document has been electronically signed. Feb 20 2021 12:29PM    < end of copied text >       Patient is a 40y old  Male who presents with a chief complaint of Colitis, with pericolic collection (23 Feb 2021 09:21)      HPI:  41 YO M with a sig PMHx of colonoscopy with polyp, with no sig PSHx. Patient presents with severe 10/10 sharp pain in the lower abdomen, non-radiating. He took Motrin, which did not help. Patient had a colonoscopy last week Teusday, for re-evaluation for new polyps. Patient states he last had a normal BM yesterday, and a small BM this AM. Patient admits to occasional cigarette smoking, and ETOH on weekends. Denies CP, SOB, palpitations, nausea or vomiting. (18 Feb 2021 18:56)    ED HPI and the hospital course reviewed  41 yo male from home, lives with his grandmother, with PMH of obesity, diverticulosis, colonoscopy with polypectomy x 2. The had his first colonoscopy with polypectomy in June, 2020 - no malignancy or dysplasia, follow up colonoscopy on 2/9/2021 with polypectomy - negative for     ID consulted for workup and antibiotic management     PAST MEDICAL & SURGICAL HISTORY:  H/O colonoscopy with polypectomy    No significant past surgical history        Allergies  No Known Allergies        ANTIMICROBIALS:  piperacillin/tazobactam IVPB.. 3.375 every 8 hours      MEDICATIONS  (STANDING):  piperacillin/tazobactam IVPB..   25 mL/Hr IV Intermittent (02-23-21 @ 09:10)   25 mL/Hr IV Intermittent (02-23-21 @ 00:44)   25 mL/Hr IV Intermittent (02-22-21 @ 17:40)   25 mL/Hr IV Intermittent (02-22-21 @ 09:20)   25 mL/Hr IV Intermittent (02-22-21 @ 00:39)   25 mL/Hr IV Intermittent (02-21-21 @ 17:32)   25 mL/Hr IV Intermittent (02-21-21 @ 08:30)   25 mL/Hr IV Intermittent (02-20-21 @ 23:07)   25 mL/Hr IV Intermittent (02-20-21 @ 16:44)   25 mL/Hr IV Intermittent (02-20-21 @ 08:28)   25 mL/Hr IV Intermittent (02-20-21 @ 00:44)   25 mL/Hr IV Intermittent (02-19-21 @ 18:13)   25 mL/Hr IV Intermittent (02-19-21 @ 08:39)   25 mL/Hr IV Intermittent (02-19-21 @ 00:51)    piperacillin/tazobactam IVPB...   200 mL/Hr IV Intermittent (02-18-21 @ 18:11)    vancomycin  IVPB.   250 mL/Hr IV Intermittent (02-18-21 @ 18:11)        OTHER MEDS: MEDICATIONS  (STANDING):  acetaminophen   Tablet .. 650 every 6 hours PRN  enoxaparin Injectable 40 daily  oxyCODONE    IR 5 every 4 hours PRN      SOCIAL HISTORY:     smokes 2-3 cigarettes a week for the last two years  alcohol once a week  denies drug abuse  lives with his grandmother      FAMILY HISTORY:  Father - heart disease  Mother - DM, Asthma, diverticulosis  aunt - lymphoma  Grandmother - lung ca      REVIEW OF SYSTEMS  [  ] ROS unobtainable because:    [  ] All other systems negative except as noted below:	    Constitutional:  [ ] fever [ ] chills  [ ] weight loss  [ ] weakness  Skin:  [ ] rash [ ] phlebitis	  Eyes: [ ] icterus [ ] pain  [ ] discharge	  ENMT: [ ] sore throat  [ ] thrush [ ] ulcers [ ] exudates  Respiratory: [ ] dyspnea [ ] hemoptysis [ ] cough [ ] sputum	  Cardiovascular:  [ ] chest pain [ ] palpitations [ ] edema	  Gastrointestinal:  [ x] nausea a couple days ago, no vomiting  [ ] vomiting  [ ] diarrhea [ ] constipation [x ] pain, + passes flatus, last BM yesterday  Genitourinary:  [ ] dysuria [ ] frequency [ ] hematuria [ ] discharge [ ] flank pain  [ ] incontinence  Musculoskeletal:  [ ] myalgias [ ] arthralgias [ ] arthritis  [ ] back pain  Neurological:  [ ] headache [ ] seizures  [ ] confusion/altered mental status  Psychiatric:  [ ] anxiety [ ] depression	  Hematology/Lymphatics:  [ ] lymphadenopathy  Endocrine:  [ ] adrenal [ ] thyroid  Allergic/Immunologic:	 [ ] transplant [ ] seasonal    Vital Signs Last 24 Hrs  T(F): 98.2 (02-23-21 @ 05:18), Max: 101.8 (02-20-21 @ 17:46)    Vital Signs Last 24 Hrs  HR: 83 (02-23-21 @ 05:18) (80 - 83)  BP: 151/95 (02-23-21 @ 05:18) (151/87 - 161/81)  RR: 18 (02-23-21 @ 05:18)  SpO2: 95% (02-23-21 @ 05:18) (95% - 97%)  Wt(kg): --    PHYSICAL EXAM:  Constitutional: non-toxic, no distress, obese  HEAD/EYES: anicteric, no conjunctival injection  ENT:  supple, mild thrush  Cardiovascular:   normal S1, S2, no murmur, mild edema of bilateral lower extremities   Respiratory:  clear BS bilaterally, no wheezes, no rales  GI:  soft, bowel sounds are hypoactive, not distended, diffuse mild tenderness of upper abdomen and LLQ  :  no robison, no CVA tenderness  Musculoskeletal:  no synovitis, normal ROM  Neurologic: awake and alert, normal strength, no focal findings  Skin:  no rash, no erythema, no phlebitis  Heme/Onc: no lymphadenopathy   Psychiatric:  awake, alert, appropriate mood          WBC Count: 17.03 K/uL (02-23 @ 06:41)  WBC Count: 18.31 K/uL (02-22 @ 07:29)  WBC Count: 24.40 K/uL (02-21 @ 08:05)  WBC Count: 23.57 K/uL (02-20 @ 07:30)  WBC Count: 21.29 K/uL (02-19 @ 07:54)  WBC Count: 20.24 K/uL (02-18 @ 15:49)                            11.8   17.03 )-----------( 432      ( 23 Feb 2021 06:41 )             36.1       02-23    141  |  108  |  5<L>  ----------------------------<  90  3.8   |  29  |  1.00    Ca    8.5      23 Feb 2021 06:41  Phos  3.2     02-23  Mg     2.2     02-23        Creatinine Trend: 1.00<--, 0.95<--, 0.90<--, 0.94<--, 1.05<--, 1.10<--        MICROBIOLOGY:    Culture - Blood (collected 02-21-21 @ 10:51)  Source: .Blood Blood-Peripheral  Preliminary Report (02-22-21 @ 11:01):    No growth to date.    Culture - Blood (collected 02-21-21 @ 10:51)  Source: .Blood Blood-Peripheral  Preliminary Report (02-22-21 @ 11:01):    No growth to date.            v  Flu With COVID-19 By NATALIE (02.18.21 @ 17:49)    SARS-CoV-2 Result: NotDetec    Influenza A Result: NotDetec: EUA/IVD    Influenza B Result: NotDetec: EUA/IVD            RADIOLOGY:    < from: CT Abdomen and Pelvis w/ Oral Cont and w/ IV Cont (02.21.21 @ 16:51) >    EXAM:  CT ABDOMEN AND PELVIS OC IC                            PROCEDURE DATE:  02/21/2021          INTERPRETATION:  CLINICAL INFORMATION: Colitis. Follow-up exam.    COMPARISON: 2/18/2021    PROCEDURE:  CT of the Abdomen and Pelvis was performed with intravenous contrast.  Intravenous contrast: 90 ml Omnipaque 350. 10 ml discarded.  Oral contrast: None.  Sagittal and coronal reformats were performed.    FINDINGS:  LOWER CHEST: Small left pleural effusion, new since 2/18/2021. Left basilar atelectasis, new since the prior study.    LIVER: Within normal limits.  BILE DUCTS: Normal caliber.  GALLBLADDER: Within normal limits.  SPLEEN: Within normal limits.  PANCREAS: Within normal limits.  ADRENALS: Within normal limits.  KIDNEYS/URETERS: Within normal limits.    BLADDER: Within normal limits.  REPRODUCTIVE ORGANS: Prostate within normal limits.    BOWEL: No bowel obstruction. Appendix is normal. Diffuse thickening of the wall of the descending colon consistent with colitis. Inflammatory changes predominantly surrounding the distal descending colon. The small pericolonic fluid collection noted on 2/18/2021 is no longer demonstrated. No evidence of abscess. There is a persistent pericolonic phlegmon, smaller since the prior exam.  PERITONEUM: Small volume of pelvic ascites, a new finding.  VESSELS: Within normal limits.  RETROPERITONEUM/LYMPH NODES: No lymphadenopathy.  ABDOMINAL WALL: Small umbilical hernia.  BONES: Within normal limits.    IMPRESSION:  Persistent colitis involvingthe descending colon. Resolution of small pericolonic fluid collection since 2/18/2021. Persistent phlegmon, smaller since 2/18/2021.    KRAIG ORTIZ MD; Attending Radiologist  This document has been electronically signed. Feb 21 2021  5:07PM    < end of copied text >    < from: CT Abdomen and Pelvis w/ IV Cont (02.18.21 @ 16:25) >    EXAM:  CT ABDOMEN AND PELVIS IC                            PROCEDURE DATE:  02/18/2021          INTERPRETATION:  CLINICAL INFORMATION: Left lower quadrant abdominal pain.    COMPARISON: None.    PROCEDURE:  CT of the Abdomen and Pelvis was performed with intravenous contrast.  Intravenous contrast: 90 ml Omnipaque 350. 10 ml discarded.  Oral contrast: None.  Sagittal and coronal reformats were performed.    FINDINGS:  LOWER CHEST: A couple subcentimeter nodules in the right middle and lower lobes measuring 0.3 cm in the right middle lobe (series 2, image 4) and 0.2 cm in the right lower lobe (series 2, image 3), nonspecific.    LIVER: Within normal limits.  BILE DUCTS: Normal caliber.  GALLBLADDER: Within normal limits.  SPLEEN: Within normal limits.  PANCREAS: Within normal limits.  ADRENALS: Within normal limits.  KIDNEYS/URETERS: Within normal limits.    BLADDER: Within normal limits.  REPRODUCTIVE ORGANS: Normal size prostate.    BOWEL/PERITONEUM: There is long segment wall thickening of the proximal sigmoid colon with marked pericolic inflammatory change including a small pericolic collection measuring 1.5 x 1.2 x 3.8 cm (series 2, image 121). Question a single inflamed diverticulum at the level of the pericolic collection. However, there is no significant colonic diverticulitis. Trace regional ascites. No bowel obstruction. Appendix is normal.    VESSELS: Patent portal veins.  RETROPERITONEUM/LYMPH NODES: No lymphadenopathy.  ABDOMINAL WALL: A tiny fat-containing umbilicalhernia.  BONES: Within normal limits.    IMPRESSION:  Long segment wall thickening of the proximal sigmoid colon with a small pericolic collection. Question a single inflamed diverticulum at the level of the pericolic collection, however, there is nosignificant colonic diverticulosis. Differential includes acute sigmoid diverticulitis and colitis. After resolution of symptomatology, recommend colonoscopy.      KATI TRUONG M.D., ATTENDING RADIOLOGIST  This document has been electronically signed. Feb 18 2021  4:46PM    < end of copied text >      < from: Xray Chest 1 View-PORTABLE IMMEDIATE (Xray Chest 1 View-PORTABLE IMMEDIATE .) (02.20.21 @ 12:16) >    EXAM:  XR CHEST PORTABLE IMMED 1V                            PROCEDURE DATE:  02/20/2021          INTERPRETATION:  AP semierect chest on February 20, 2021 at 11:23 AM. Patient has leukocytosis.    CAT scan of February 18 showed pericolonic sigmoid abscess.    COMPARISON: None available.    Shallow inspiration crowds the chest.    Heart magnified by technique.    Lungs clear.    IMPRESSION: No acute finding.            BRAD CASTRO MD; Attending Radiologist  This document has been electronically signed. Feb 20 2021 12:29PM    < end of copied text >       Patient is a 40y old  Male who presents with a chief complaint of Colitis, with pericolic collection (23 Feb 2021 09:21)      HPI:  39 YO M with a sig PMHx of colonoscopy with polyp, with no sig PSHx. Patient presents with severe 10/10 sharp pain in the lower abdomen, non-radiating. He took Motrin, which did not help. Patient had a colonoscopy last week Teusday, for re-evaluation for new polyps. Patient states he last had a normal BM yesterday, and a small BM this AM. Patient admits to occasional cigarette smoking, and ETOH on weekends. Denies CP, SOB, palpitations, nausea or vomiting. (18 Feb 2021 18:56)    ED HPI and the hospital course reviewed  41 yo male from home, lives with his grandmother, with PMH of obesity, diverticulosis, colonoscopy with polypectomy x 2. The had his first colonoscopy with polypectomy in June, 2020 - no malignancy or dysplasia, follow up colonoscopy on 2/9/2021 with polypectomy - also negative for malignancy or adenoma. The pt noted to have abdominal pain at home, chills, night sweats.   On admission, the pt had a low grade fever of 100.4, Tmax 101.8 on 2/20, noted to have leukocytosis - 20.24 on admission, now trending down and 17.03 today.   Pt's COVID 19 testing is negative, urine and blood cultures from 2/18 with no growth, repeat blood cultures from 2/21 with no growth to date.   CT abdomen and pelvis was performed 2/18 - descending / sigmoid inflammation with pericolonic abscess. CT abdomen and pelvis was repeated on 2/21 - Persistent colitis involving the descending colon. Resolution of small pericolonic fluid collection since 2/18/2021. Persistent phlegmon, smaller since 2/18/2021.  The pt received a dose of vancomycin on admission, now on Zosyn 3.375 g IV q 8 hrs (day #5).   The pt was seen by surgery - no intervention at this time, the pt was seen by IR - no drainable collection.   On exam, the pt continues to complain of diffuse abdominal pain 4/10 in upper part of his abdomen and mild abdominal pain in his LLQ, passes flatus, had a bowel movement yesterday, complains of poor appetite.    ID consulted for workup and antibiotic management     PAST MEDICAL & SURGICAL HISTORY:  H/O colonoscopy with polypectomy    No significant past surgical history        Allergies  No Known Allergies        ANTIMICROBIALS:  piperacillin/tazobactam IVPB.. 3.375 every 8 hours      MEDICATIONS  (STANDING):  piperacillin/tazobactam IVPB..   25 mL/Hr IV Intermittent (02-23-21 @ 09:10)   25 mL/Hr IV Intermittent (02-23-21 @ 00:44)   25 mL/Hr IV Intermittent (02-22-21 @ 17:40)   25 mL/Hr IV Intermittent (02-22-21 @ 09:20)   25 mL/Hr IV Intermittent (02-22-21 @ 00:39)   25 mL/Hr IV Intermittent (02-21-21 @ 17:32)   25 mL/Hr IV Intermittent (02-21-21 @ 08:30)   25 mL/Hr IV Intermittent (02-20-21 @ 23:07)   25 mL/Hr IV Intermittent (02-20-21 @ 16:44)   25 mL/Hr IV Intermittent (02-20-21 @ 08:28)   25 mL/Hr IV Intermittent (02-20-21 @ 00:44)   25 mL/Hr IV Intermittent (02-19-21 @ 18:13)   25 mL/Hr IV Intermittent (02-19-21 @ 08:39)   25 mL/Hr IV Intermittent (02-19-21 @ 00:51)    piperacillin/tazobactam IVPB...   200 mL/Hr IV Intermittent (02-18-21 @ 18:11)    vancomycin  IVPB.   250 mL/Hr IV Intermittent (02-18-21 @ 18:11)        OTHER MEDS: MEDICATIONS  (STANDING):  acetaminophen   Tablet .. 650 every 6 hours PRN  enoxaparin Injectable 40 daily  oxyCODONE    IR 5 every 4 hours PRN      SOCIAL HISTORY:     smokes 2-3 cigarettes a week for the last two years  alcohol once a week  denies drug abuse  lives with his grandmother      FAMILY HISTORY:  Father - heart disease  Mother - DM, Asthma, diverticulosis  aunt - lymphoma  Grandmother - lung ca      REVIEW OF SYSTEMS  [  ] ROS unobtainable because:    [  ] All other systems negative except as noted below:	    Constitutional:  [ ] fever [ ] chills  [ ] weight loss  [ ] weakness  Skin:  [ ] rash [ ] phlebitis	  Eyes: [ ] icterus [ ] pain  [ ] discharge	  ENMT: [ ] sore throat  [ ] thrush [ ] ulcers [ ] exudates  Respiratory: [ ] dyspnea [ ] hemoptysis [ ] cough [ ] sputum	  Cardiovascular:  [ ] chest pain [ ] palpitations [ ] edema	  Gastrointestinal:  [ x] nausea a couple days ago, no vomiting  [ ] vomiting  [ ] diarrhea [ ] constipation [x ] pain, + passes flatus, last BM yesterday, feels that he needs to have a bowel movement but unable   Genitourinary:  [ ] dysuria [ ] frequency [ ] hematuria [ ] discharge [ ] flank pain  [ ] incontinence  Musculoskeletal:  [ ] myalgias [ ] arthralgias [ ] arthritis  [ ] back pain  Neurological:  [ ] headache [ ] seizures  [ ] confusion/altered mental status  Psychiatric:  [ ] anxiety [ ] depression	  Hematology/Lymphatics:  [ ] lymphadenopathy  Endocrine:  [ ] adrenal [ ] thyroid  Allergic/Immunologic:	 [ ] transplant [ ] seasonal    Vital Signs Last 24 Hrs  T(F): 98.2 (02-23-21 @ 05:18), Max: 101.8 (02-20-21 @ 17:46)    Vital Signs Last 24 Hrs  HR: 83 (02-23-21 @ 05:18) (80 - 83)  BP: 151/95 (02-23-21 @ 05:18) (151/87 - 161/81)  RR: 18 (02-23-21 @ 05:18)  SpO2: 95% (02-23-21 @ 05:18) (95% - 97%)  Wt(kg): --    PHYSICAL EXAM:  Constitutional: non-toxic, no distress, obese  HEAD/EYES: anicteric, no conjunctival injection  ENT:  supple, mild thrush   Cardiovascular:   normal S1, S2, no murmur, mild edema of bilateral lower extremities   Respiratory:  clear BS bilaterally, no wheezes, no rales  GI:  soft, bowel sounds are hypoactive, not distended, diffuse mild tenderness of upper abdomen and LLQ  :  no robison, no CVA tenderness  Musculoskeletal:  no synovitis, normal ROM  Neurologic: awake and alert, normal strength, no focal findings  Skin:  no rash, no erythema, no phlebitis  Heme/Onc: no lymphadenopathy   Psychiatric:  awake, alert, appropriate mood          WBC Count: 17.03 K/uL (02-23 @ 06:41)  WBC Count: 18.31 K/uL (02-22 @ 07:29)  WBC Count: 24.40 K/uL (02-21 @ 08:05)  WBC Count: 23.57 K/uL (02-20 @ 07:30)  WBC Count: 21.29 K/uL (02-19 @ 07:54)  WBC Count: 20.24 K/uL (02-18 @ 15:49)                            11.8   17.03 )-----------( 432      ( 23 Feb 2021 06:41 )             36.1       02-23    141  |  108  |  5<L>  ----------------------------<  90  3.8   |  29  |  1.00    Ca    8.5      23 Feb 2021 06:41  Phos  3.2     02-23  Mg     2.2     02-23        Creatinine Trend: 1.00<--, 0.95<--, 0.90<--, 0.94<--, 1.05<--, 1.10<--        MICROBIOLOGY:    Culture - Blood (collected 02-21-21 @ 10:51)  Source: .Blood Blood-Peripheral  Preliminary Report (02-22-21 @ 11:01):    No growth to date.    Culture - Blood (collected 02-21-21 @ 10:51)  Source: .Blood Blood-Peripheral  Preliminary Report (02-22-21 @ 11:01):    No growth to date.            v  Flu With COVID-19 By NATALIE (02.18.21 @ 17:49)    SARS-CoV-2 Result: NotDetec    Influenza A Result: NotDetec: EUA/IVD    Influenza B Result: NotDetec: EUA/IVD            RADIOLOGY:    < from: CT Abdomen and Pelvis w/ Oral Cont and w/ IV Cont (02.21.21 @ 16:51) >    EXAM:  CT ABDOMEN AND PELVIS OC IC                            PROCEDURE DATE:  02/21/2021          INTERPRETATION:  CLINICAL INFORMATION: Colitis. Follow-up exam.    COMPARISON: 2/18/2021    PROCEDURE:  CT of the Abdomen and Pelvis was performed with intravenous contrast.  Intravenous contrast: 90 ml Omnipaque 350. 10 ml discarded.  Oral contrast: None.  Sagittal and coronal reformats were performed.    FINDINGS:  LOWER CHEST: Small left pleural effusion, new since 2/18/2021. Left basilar atelectasis, new since the prior study.    LIVER: Within normal limits.  BILE DUCTS: Normal caliber.  GALLBLADDER: Within normal limits.  SPLEEN: Within normal limits.  PANCREAS: Within normal limits.  ADRENALS: Within normal limits.  KIDNEYS/URETERS: Within normal limits.    BLADDER: Within normal limits.  REPRODUCTIVE ORGANS: Prostate within normal limits.    BOWEL: No bowel obstruction. Appendix is normal. Diffuse thickening of the wall of the descending colon consistent with colitis. Inflammatory changes predominantly surrounding the distal descending colon. The small pericolonic fluid collection noted on 2/18/2021 is no longer demonstrated. No evidence of abscess. There is a persistent pericolonic phlegmon, smaller since the prior exam.  PERITONEUM: Small volume of pelvic ascites, a new finding.  VESSELS: Within normal limits.  RETROPERITONEUM/LYMPH NODES: No lymphadenopathy.  ABDOMINAL WALL: Small umbilical hernia.  BONES: Within normal limits.    IMPRESSION:  Persistent colitis involvingthe descending colon. Resolution of small pericolonic fluid collection since 2/18/2021. Persistent phlegmon, smaller since 2/18/2021.    KRAIG ORTIZ MD; Attending Radiologist  This document has been electronically signed. Feb 21 2021  5:07PM    < end of copied text >    < from: CT Abdomen and Pelvis w/ IV Cont (02.18.21 @ 16:25) >    EXAM:  CT ABDOMEN AND PELVIS IC                            PROCEDURE DATE:  02/18/2021          INTERPRETATION:  CLINICAL INFORMATION: Left lower quadrant abdominal pain.    COMPARISON: None.    PROCEDURE:  CT of the Abdomen and Pelvis was performed with intravenous contrast.  Intravenous contrast: 90 ml Omnipaque 350. 10 ml discarded.  Oral contrast: None.  Sagittal and coronal reformats were performed.    FINDINGS:  LOWER CHEST: A couple subcentimeter nodules in the right middle and lower lobes measuring 0.3 cm in the right middle lobe (series 2, image 4) and 0.2 cm in the right lower lobe (series 2, image 3), nonspecific.    LIVER: Within normal limits.  BILE DUCTS: Normal caliber.  GALLBLADDER: Within normal limits.  SPLEEN: Within normal limits.  PANCREAS: Within normal limits.  ADRENALS: Within normal limits.  KIDNEYS/URETERS: Within normal limits.    BLADDER: Within normal limits.  REPRODUCTIVE ORGANS: Normal size prostate.    BOWEL/PERITONEUM: There is long segment wall thickening of the proximal sigmoid colon with marked pericolic inflammatory change including a small pericolic collection measuring 1.5 x 1.2 x 3.8 cm (series 2, image 121). Question a single inflamed diverticulum at the level of the pericolic collection. However, there is no significant colonic diverticulitis. Trace regional ascites. No bowel obstruction. Appendix is normal.    VESSELS: Patent portal veins.  RETROPERITONEUM/LYMPH NODES: No lymphadenopathy.  ABDOMINAL WALL: A tiny fat-containing umbilicalhernia.  BONES: Within normal limits.    IMPRESSION:  Long segment wall thickening of the proximal sigmoid colon with a small pericolic collection. Question a single inflamed diverticulum at the level of the pericolic collection, however, there is nosignificant colonic diverticulosis. Differential includes acute sigmoid diverticulitis and colitis. After resolution of symptomatology, recommend colonoscopy.      KATI TRUONG M.D., ATTENDING RADIOLOGIST  This document has been electronically signed. Feb 18 2021  4:46PM    < end of copied text >      < from: Xray Chest 1 View-PORTABLE IMMEDIATE (Xray Chest 1 View-PORTABLE IMMEDIATE .) (02.20.21 @ 12:16) >    EXAM:  XR CHEST PORTABLE IMMED 1V                            PROCEDURE DATE:  02/20/2021          INTERPRETATION:  AP semierect chest on February 20, 2021 at 11:23 AM. Patient has leukocytosis.    CAT scan of February 18 showed pericolonic sigmoid abscess.    COMPARISON: None available.    Shallow inspiration crowds the chest.    Heart magnified by technique.    Lungs clear.    IMPRESSION: No acute finding.            BRAD CASTRO MD; Attending Radiologist  This document has been electronically signed. Feb 20 2021 12:29PM    < end of copied text >       Patient is a 40y old  Male who presents with a chief complaint of Colitis, with pericolic collection (23 Feb 2021 09:21)      HPI:  39 YO M with a sig PMHx of colonoscopy with polyp, with no sig PSHx. Patient presents with severe 10/10 sharp pain in the lower abdomen, non-radiating. He took Motrin, which did not help. Patient had a colonoscopy last week Teusday, for re-evaluation for new polyps. Patient states he last had a normal BM yesterday, and a small BM this AM. Patient admits to occasional cigarette smoking, and ETOH on weekends. Denies CP, SOB, palpitations, nausea or vomiting. (18 Feb 2021 18:56)    ED HPI and the hospital course reviewed  41 yo male from home, lives with his grandmother, with PMH of obesity, diverticulosis, colonoscopy with polypectomy x 2. The had his first colonoscopy with polypectomy in June, 2020 - no malignancy or dysplasia, follow up colonoscopy on 2/9/2021 with polypectomy - also negative for malignancy or adenoma. The pt noted to have abdominal pain at home, chills, night sweats.   On admission, the pt had a low grade fever of 100.4, Tmax 101.8 on 2/20, noted to have leukocytosis - 20.24 on admission, now trending down and 17.03 today.   Pt's COVID 19 testing is negative, urine and blood cultures from 2/18 with no growth, repeat blood cultures from 2/21 with no growth to date.   CT abdomen and pelvis was performed 2/18 - descending / sigmoid inflammation with pericolonic abscess. CT abdomen and pelvis was repeated on 2/21 - Persistent colitis involving the descending colon. Resolution of small pericolonic fluid collection since 2/18/2021. Persistent phlegmon, smaller since 2/18/2021.  The pt received a dose of vancomycin on admission, now on Zosyn 3.375 g IV q 8 hrs (day #5).   The pt was seen by surgery - no intervention at this time, the pt was seen by IR - no drainable collection.   On exam, the pt continues to complain of diffuse abdominal pain 4/10 in upper part of his abdomen and mild abdominal pain in his LLQ, passes flatus, had a bowel movement yesterday, complains of poor appetite.    ID consulted for workup and antibiotic management     PAST MEDICAL & SURGICAL HISTORY:  H/O colonoscopy with polypectomy    No significant past surgical history        Allergies  No Known Allergies        ANTIMICROBIALS:  piperacillin/tazobactam IVPB.. 3.375 every 8 hours      MEDICATIONS  (STANDING):  piperacillin/tazobactam IVPB..   25 mL/Hr IV Intermittent (02-23-21 @ 09:10)   25 mL/Hr IV Intermittent (02-23-21 @ 00:44)   25 mL/Hr IV Intermittent (02-22-21 @ 17:40)   25 mL/Hr IV Intermittent (02-22-21 @ 09:20)   25 mL/Hr IV Intermittent (02-22-21 @ 00:39)   25 mL/Hr IV Intermittent (02-21-21 @ 17:32)   25 mL/Hr IV Intermittent (02-21-21 @ 08:30)   25 mL/Hr IV Intermittent (02-20-21 @ 23:07)   25 mL/Hr IV Intermittent (02-20-21 @ 16:44)   25 mL/Hr IV Intermittent (02-20-21 @ 08:28)   25 mL/Hr IV Intermittent (02-20-21 @ 00:44)   25 mL/Hr IV Intermittent (02-19-21 @ 18:13)   25 mL/Hr IV Intermittent (02-19-21 @ 08:39)   25 mL/Hr IV Intermittent (02-19-21 @ 00:51)    piperacillin/tazobactam IVPB...   200 mL/Hr IV Intermittent (02-18-21 @ 18:11)    vancomycin  IVPB.   250 mL/Hr IV Intermittent (02-18-21 @ 18:11)        OTHER MEDS: MEDICATIONS  (STANDING):  acetaminophen   Tablet .. 650 every 6 hours PRN  enoxaparin Injectable 40 daily  oxyCODONE    IR 5 every 4 hours PRN      SOCIAL HISTORY:     smokes 2-3 cigarettes a week for the last two years  alcohol once a week  denies drug abuse  lives with his grandmother      FAMILY HISTORY:  Father - heart disease  Mother - DM, Asthma, diverticulosis  aunt - lymphoma  Grandmother - lung ca      REVIEW OF SYSTEMS  [  ] ROS unobtainable because:    [ X ] All other systems negative except as noted below:	    Constitutional:  [ ] fever [ ] chills  [ ] weight loss  [ ] weakness  Skin:  [ ] rash [ ] phlebitis	  Eyes: [ ] icterus [ ] pain  [ ] discharge	  ENMT: [ ] sore throat  [ ] thrush [ ] ulcers [ ] exudates  Respiratory: [ ] dyspnea [ ] hemoptysis [ ] cough [ ] sputum	  Cardiovascular:  [ ] chest pain [ ] palpitations [ ] edema	  Gastrointestinal:  [ x] nausea a couple days ago, no vomiting  [ ] vomiting  [ ] diarrhea [ ] constipation [x ] pain, + passes flatus, last BM yesterday, feels that he needs to have a bowel movement but unable   Genitourinary:  [ ] dysuria [ ] frequency [ ] hematuria [ ] discharge [ ] flank pain  [ ] incontinence  Musculoskeletal:  [ ] myalgias [ ] arthralgias [ ] arthritis  [ ] back pain  Neurological:  [ ] headache [ ] seizures  [ ] confusion/altered mental status  Psychiatric:  [ ] anxiety [ ] depression	  Hematology/Lymphatics:  [ ] lymphadenopathy  Endocrine:  [ ] adrenal [ ] thyroid  Allergic/Immunologic:	 [ ] transplant [ ] seasonal    Vital Signs Last 24 Hrs  T(F): 98.2 (02-23-21 @ 05:18), Max: 101.8 (02-20-21 @ 17:46)    Vital Signs Last 24 Hrs  HR: 83 (02-23-21 @ 05:18) (80 - 83)  BP: 151/95 (02-23-21 @ 05:18) (151/87 - 161/81)  RR: 18 (02-23-21 @ 05:18)  SpO2: 95% (02-23-21 @ 05:18) (95% - 97%)  Wt(kg): --    PHYSICAL EXAM:  Constitutional: non-toxic, no distress, obese  HEAD/EYES: anicteric, no conjunctival injection  ENT:  supple, mild thrush   Cardiovascular:   normal S1, S2, no murmur, mild edema of bilateral lower extremities   Respiratory:  clear BS bilaterally, no wheezes, no rales  GI:  soft, bowel sounds are hypoactive, not distended, diffuse mild tenderness of upper abdomen and LLQ  :  no robison, no CVA tenderness  Musculoskeletal:  no synovitis, normal ROM  Neurologic: awake and alert, normal strength, no focal findings  Skin:  no rash, no erythema, no phlebitis  Heme/Onc: no lymphadenopathy   Psychiatric:  awake, alert, appropriate mood          WBC Count: 17.03 K/uL (02-23 @ 06:41)  WBC Count: 18.31 K/uL (02-22 @ 07:29)  WBC Count: 24.40 K/uL (02-21 @ 08:05)  WBC Count: 23.57 K/uL (02-20 @ 07:30)  WBC Count: 21.29 K/uL (02-19 @ 07:54)  WBC Count: 20.24 K/uL (02-18 @ 15:49)                            11.8   17.03 )-----------( 432      ( 23 Feb 2021 06:41 )             36.1       02-23    141  |  108  |  5<L>  ----------------------------<  90  3.8   |  29  |  1.00    Ca    8.5      23 Feb 2021 06:41  Phos  3.2     02-23  Mg     2.2     02-23        Creatinine Trend: 1.00<--, 0.95<--, 0.90<--, 0.94<--, 1.05<--, 1.10<--        MICROBIOLOGY:    Culture - Blood (collected 02-21-21 @ 10:51)  Source: .Blood Blood-Peripheral  Preliminary Report (02-22-21 @ 11:01):    No growth to date.    Culture - Blood (collected 02-21-21 @ 10:51)  Source: .Blood Blood-Peripheral  Preliminary Report (02-22-21 @ 11:01):    No growth to date.    Flu With COVID-19 By NATALIE (02.18.21 @ 17:49)    SARS-CoV-2 Result: NotDetec    Influenza A Result: NotDetec: EUA/IVD    Influenza B Result: NotDetec: EUA/IVD    RADIOLOGY:  CT Abdomen and Pelvis w/ Oral Cont and w/ IV Cont (02.21.21 @ 16:51)   IMPRESSION:  Persistent colitis involvingthe descending colon. Resolution of small pericolonic fluid collection since 2/18/2021. Persistent phlegmon, smaller since 2/18/2021.    CT Abdomen and Pelvis w/ IV Cont (02.18.21 @ 16:25)   LIVER: Within normal limits.  BILE DUCTS: Normal caliber.  GALLBLADDER: Within normal limits.  SPLEEN: Within normal limits.  PANCREAS: Within normal limits.  ADRENALS: Within normal limits.  KIDNEYS/URETERS: Within normal limits.    BLADDER: Within normal limits.  REPRODUCTIVE ORGANS: Normal size prostate.    BOWEL/PERITONEUM: There is long segment wall thickening of the proximal sigmoid colon with marked pericolic inflammatory change including a small pericolic collection measuring 1.5 x 1.2 x 3.8 cm (series 2, image 121). Question a single inflamed diverticulum at the level of the pericolic collection. However, there is no significant colonic diverticulitis. Trace regional ascites. No bowel obstruction. Appendix is normal.    VESSELS: Patent portal veins.  RETROPERITONEUM/LYMPH NODES: No lymphadenopathy.  ABDOMINAL WALL: A tiny fat-containing umbilical hernia.  BONES: Within normal limits.    IMPRESSION:  Long segment wall thickening of the proximal sigmoid colon with a small pericolic collection. Question a single inflamed diverticulum at the level of the pericolic collection, however, there is nosignificant colonic diverticulosis. Differential includes acute sigmoid diverticulitis and colitis. After resolution of symptomatology, recommend colonoscopy.    Xray Chest 1 View-PORTABLE IMMEDIATE (Xray Chest 1 View-PORTABLE IMMEDIATE .) (02.20.21 @ 12:16)   IMPRESSION: No acute finding.

## 2021-02-23 NOTE — CONSULT NOTE ADULT - ASSESSMENT
full note to follow    1. colitis  2. pericolic collection  3. ?phlegmon  4. leukocytosis  5. fevers    suggestions  - continue zosyn q 8 hrs 41 yo male with PMH of obesity, diverticulosis, colonoscopy with polypectomy x 2. The had his first colonoscopy with polypectomy in June, 2020 - no malignancy or dysplasia, follow up colonoscopy on 2/9/2021 with polypectomy - also negative for malignancy or adenoma. The pt was admitted with abdominal pain.   On admission, the pt had a low grade fever of 100.4, Tmax 101.8 on 2/20, noted to have leukocytosis - 20.24 on admission, now trending down and 17.03 today.   Pt's COVID 19 testing is negative, urine and blood cultures from 2/18 with no growth, repeat blood cultures from 2/21 with no growth to date.   CT abdomen and pelvis was performed 2/18 - descending / sigmoid inflammation with pericolonic abscess. CT abdomen and pelvis was repeated on 2/21 - Persistent colitis involving the descending colon. Resolution of small pericolonic fluid collection since 2/18/2021. Persistent phlegmon, smaller since 2/18/2021.  The pt received a dose of vancomycin on admission, now on Zosyn 3.375 g IV q 8 hrs (day #5).   The pt was seen by surgery - no intervention at this time, the pt was seen by IR - no drainable collection.   On exam, the pt continues to complain of diffuse abdominal pain 4/10 in upper part of his abdomen and mild abdominal pain in his LLQ, passes flatus, had a bowel movement yesterday, complains of poor appetite, tolerates clear liquids.     Will continue with IV Zosyn, the pt is feeling better overall, no longer has fevers, abdominal pain is still there but improving, WBC trending down and cultures with  no growth.     1. colitis  2. pericolic collection - resolved on last CT  3. phlegmon - smaller   4. leukocytosis - decreasing   5. fevers - resolved     suggestions  - continue zosyn q 8 hrs for now (day #5)  - monitor pt's temperatures  - monitor WBC  - pain management   - advance diet as tolerated   - DVT prophylaxis as per protocol  41 yo male with PMH of obesity, diverticulosis, colonoscopy with polypectomy x 2. The had his first colonoscopy with polypectomy in June, 2020 - no malignancy or dysplasia, follow up colonoscopy on 2/9/2021 with polypectomy - also negative for malignancy or adenoma. The pt was admitted with abdominal pain.   On admission, the pt had a low grade fever of 100.4, Tmax 101.8 on 2/20, noted to have leukocytosis - 20.24 on admission, now trending down and 17.03 today.   Pt's COVID 19 testing is negative, urine and blood cultures from 2/18 with no growth, repeat blood cultures from 2/21 with no growth to date.   CT abdomen and pelvis was performed 2/18 - descending / sigmoid inflammation with pericolonic abscess. CT abdomen and pelvis was repeated on 2/21 - Persistent colitis involving the descending colon. Resolution of small pericolonic fluid collection since 2/18/2021. Persistent phlegmon, smaller since 2/18/2021.  The pt received a dose of vancomycin on admission, now on Zosyn 3.375 g IV q 8 hrs (day #5).   The pt was seen by surgery - no intervention at this time, the pt was seen by IR - no drainable collection.   On exam, the pt continues to complain of diffuse abdominal pain 4/10 in upper part of his abdomen and mild abdominal pain in his LLQ, passes flatus, had a bowel movement yesterday, complains of poor appetite, tolerates clear liquids.     Will continue with IV Zosyn, the pt is feeling better overall, no longer has fevers, abdominal pain is still there but improving, WBC trending down and cultures with  no growth.     1. colitis  2. pericolic collection - resolved on last CT  3. phlegmon - smaller   4. leukocytosis - decreasing   5. fevers - resolved     suggestions  - continue (Zosyn) Piperacillin/tazobactam 3.375 grams every 8 hours  for now (day #5)  - monitor pt's temperatures  - monitor WBC  - pain management   - advance diet as tolerated   - DVT prophylaxis as per protocol

## 2021-02-23 NOTE — PROGRESS NOTE ADULT - SUBJECTIVE AND OBJECTIVE BOX
INTERVAL HPI/OVERNIGHT EVENTS:  Pt. seen and examined at bedside resting comfortably. Patient c/o intermittent "squeezing" and "crampy" lower abdominal pains, nonfocal with referred pain to the Left side of his back. States his LLQ abdominal pain is improving, no longer experiencing constant pain. Denies N/V. Tolerating clear liquid diet. +Flatus/BM  Denies fever/chills, chest pain, dyspnea, cough, dizziness.     Vital Signs Last 24 Hrs  T(C): 36.8 (23 Feb 2021 05:18), Max: 37.6 (22 Feb 2021 10:57)  T(F): 98.2 (23 Feb 2021 05:18), Max: 99.6 (22 Feb 2021 10:57)  HR: 83 (23 Feb 2021 05:18) (80 - 86)  BP: 151/95 (23 Feb 2021 05:18) (129/89 - 161/81)  RR: 18 (23 Feb 2021 05:18) (17 - 18)  SpO2: 95% (23 Feb 2021 05:18) (95% - 97%)    PHYSICAL EXAM:      I&O's Detail    22 Feb 2021 07:01  -  23 Feb 2021 07:00  --------------------------------------------------------  IN:    dextrose 5% + sodium chloride 0.9% w/ Additives: 1800 mL    IV PiggyBack: 100 mL    Oral Fluid: 200 mL  Total IN: 2100 mL    OUT:    Voided (mL): 400 mL  Total OUT: 400 mL    Total NET: 1700 mL      LABS:                        11.8   17.03 )-----------( 432      ( 23 Feb 2021 06:41 )             36.1     02-23    141  |  108  |  5<L>  ----------------------------<  90  3.8   |  29  |  1.00    Ca    8.5      23 Feb 2021 06:41  Phos  3.2     02-23  Mg     2.2     02-23      Culture Results:   No growth to date. (02-21 @ 10:51)  Culture Results:   No growth to date. (02-21 @ 10:51)       INTERVAL HPI/OVERNIGHT EVENTS:  Pt. seen and examined at bedside with Dr. Wong. Patient c/o intermittent "squeezing" pains and gas pains, nonfocal with referred pain to the Left side of his back. States his LLQ abdominal pain is improving, no longer experiencing constant pain. Denies N/V. Tolerating clear liquid diet. +Flatus/BM  Denies fever/chills, chest pain, dyspnea, cough, dizziness.     Vital Signs Last 24 Hrs  T(C): 36.8 (23 Feb 2021 05:18), Max: 37.6 (22 Feb 2021 10:57)  T(F): 98.2 (23 Feb 2021 05:18), Max: 99.6 (22 Feb 2021 10:57)  HR: 83 (23 Feb 2021 05:18) (80 - 86)  BP: 151/95 (23 Feb 2021 05:18) (129/89 - 161/81)  RR: 18 (23 Feb 2021 05:18) (17 - 18)  SpO2: 95% (23 Feb 2021 05:18) (95% - 97%)    PHYSICAL EXAM:    GENERAL: NAD  CHEST/LUNG: Clear to ausculation, bilaterally   HEART: RRR S1S2  ABDOMEN: Obese, non distended, +BS, soft, mild ttp in LLQ (improving), no guarding, no rigidity   EXTREMITIES:  calf soft, non tender b/l    I&O's Detail    22 Feb 2021 07:01  -  23 Feb 2021 07:00  --------------------------------------------------------  IN:    dextrose 5% + sodium chloride 0.9% w/ Additives: 1800 mL    IV PiggyBack: 100 mL    Oral Fluid: 200 mL  Total IN: 2100 mL    OUT:    Voided (mL): 400 mL  Total OUT: 400 mL    Total NET: 1700 mL      LABS:                        11.8   17.03 )-----------( 432      ( 23 Feb 2021 06:41 )             36.1     02-23    141  |  108  |  5<L>  ----------------------------<  90  3.8   |  29  |  1.00    Ca    8.5      23 Feb 2021 06:41  Phos  3.2     02-23  Mg     2.2     02-23      Culture Results:   No growth to date. (02-21 @ 10:51)  Culture Results:   No growth to date. (02-21 @ 10:51)      Assessment: 40M PMHx colonoscopy and polypectomy approximately 6 months ago, recent repeat colonoscopy and ?polypectomy admitted with colitis and perisigmoid fluid collection 1.5 x 1.2 x 3.8 cm.   Colonoscopy reports: Patient has history of diverticulosis noted on 6/3/20 report. He has had two polyps 4.9 cm and 0.9 cm removed resulting tubulovillous adenoma negative for high grade dysplasia or malignancy and polypoid colonic mucosa negative for adenoma / malignancy respectively on 6/3/20. On 2/9/21 he had repeat colonoscopy and biopsy of rectum hyperplastic polyp 0.2cm negative for adenoma or malignancy. No diverticulosis noted on this colonoscopy.  No drainable collection per IR.  Rpt CT 2/21: resolution of pericolonic collection  Leukocytosis slowly downtrending, afebrile. Blood cx: NGTD    Plan:  - Continue IV Zosyn, trend WBC  - ID consult as per Dr. Wnog (Dr. Reaves to see patient)  - Continue Clear liquids today, IV hydration. Possible advancement of diet tomorrow if clinically improving   - pain management prn  - continue DVT prophylaxis, OOB, Ambulating   - f/u AM labs, replete lytes prn  Discussed with Dr. Wong

## 2021-02-24 LAB
ANION GAP SERPL CALC-SCNC: 6 MMOL/L — SIGNIFICANT CHANGE UP (ref 5–17)
BUN SERPL-MCNC: 6 MG/DL — LOW (ref 7–23)
CALCIUM SERPL-MCNC: 8.8 MG/DL — SIGNIFICANT CHANGE UP (ref 8.5–10.1)
CHLORIDE SERPL-SCNC: 104 MMOL/L — SIGNIFICANT CHANGE UP (ref 96–108)
CO2 SERPL-SCNC: 29 MMOL/L — SIGNIFICANT CHANGE UP (ref 22–31)
CREAT SERPL-MCNC: 1.05 MG/DL — SIGNIFICANT CHANGE UP (ref 0.5–1.3)
CULTURE RESULTS: SIGNIFICANT CHANGE UP
CULTURE RESULTS: SIGNIFICANT CHANGE UP
GLUCOSE SERPL-MCNC: 82 MG/DL — SIGNIFICANT CHANGE UP (ref 70–99)
HCT VFR BLD CALC: 38.5 % — LOW (ref 39–50)
HGB BLD-MCNC: 12.2 G/DL — LOW (ref 13–17)
MAGNESIUM SERPL-MCNC: 2.6 MG/DL — SIGNIFICANT CHANGE UP (ref 1.6–2.6)
MCHC RBC-ENTMCNC: 26.8 PG — LOW (ref 27–34)
MCHC RBC-ENTMCNC: 31.7 GM/DL — LOW (ref 32–36)
MCV RBC AUTO: 84.4 FL — SIGNIFICANT CHANGE UP (ref 80–100)
NRBC # BLD: 0 /100 WBCS — SIGNIFICANT CHANGE UP (ref 0–0)
PHOSPHATE SERPL-MCNC: 3.7 MG/DL — SIGNIFICANT CHANGE UP (ref 2.5–4.5)
PLATELET # BLD AUTO: 505 K/UL — HIGH (ref 150–400)
POTASSIUM SERPL-MCNC: 4.1 MMOL/L — SIGNIFICANT CHANGE UP (ref 3.5–5.3)
POTASSIUM SERPL-SCNC: 4.1 MMOL/L — SIGNIFICANT CHANGE UP (ref 3.5–5.3)
RBC # BLD: 4.56 M/UL — SIGNIFICANT CHANGE UP (ref 4.2–5.8)
RBC # FLD: 14 % — SIGNIFICANT CHANGE UP (ref 10.3–14.5)
SODIUM SERPL-SCNC: 139 MMOL/L — SIGNIFICANT CHANGE UP (ref 135–145)
SPECIMEN SOURCE: SIGNIFICANT CHANGE UP
SPECIMEN SOURCE: SIGNIFICANT CHANGE UP
WBC # BLD: 16.72 K/UL — HIGH (ref 3.8–10.5)
WBC # FLD AUTO: 16.72 K/UL — HIGH (ref 3.8–10.5)

## 2021-02-24 PROCEDURE — 99232 SBSQ HOSP IP/OBS MODERATE 35: CPT

## 2021-02-24 RX ORDER — POLYETHYLENE GLYCOL 3350 17 G/17G
17 POWDER, FOR SOLUTION ORAL ONCE
Refills: 0 | Status: COMPLETED | OUTPATIENT
Start: 2021-02-24 | End: 2021-02-24

## 2021-02-24 RX ORDER — SENNA PLUS 8.6 MG/1
2 TABLET ORAL AT BEDTIME
Refills: 0 | Status: DISCONTINUED | OUTPATIENT
Start: 2021-02-24 | End: 2021-02-26

## 2021-02-24 RX ADMIN — Medication 5 MILLIGRAM(S): at 08:59

## 2021-02-24 RX ADMIN — ENOXAPARIN SODIUM 40 MILLIGRAM(S): 100 INJECTION SUBCUTANEOUS at 10:58

## 2021-02-24 RX ADMIN — SENNA PLUS 2 TABLET(S): 8.6 TABLET ORAL at 21:31

## 2021-02-24 RX ADMIN — OXYCODONE HYDROCHLORIDE 5 MILLIGRAM(S): 5 TABLET ORAL at 15:00

## 2021-02-24 RX ADMIN — OXYCODONE HYDROCHLORIDE 5 MILLIGRAM(S): 5 TABLET ORAL at 06:27

## 2021-02-24 RX ADMIN — PIPERACILLIN AND TAZOBACTAM 25 GRAM(S): 4; .5 INJECTION, POWDER, LYOPHILIZED, FOR SOLUTION INTRAVENOUS at 08:59

## 2021-02-24 RX ADMIN — PIPERACILLIN AND TAZOBACTAM 25 GRAM(S): 4; .5 INJECTION, POWDER, LYOPHILIZED, FOR SOLUTION INTRAVENOUS at 01:37

## 2021-02-24 RX ADMIN — Medication 3 MILLIGRAM(S): at 21:31

## 2021-02-24 RX ADMIN — POLYETHYLENE GLYCOL 3350 17 GRAM(S): 17 POWDER, FOR SOLUTION ORAL at 10:58

## 2021-02-24 RX ADMIN — PIPERACILLIN AND TAZOBACTAM 25 GRAM(S): 4; .5 INJECTION, POWDER, LYOPHILIZED, FOR SOLUTION INTRAVENOUS at 17:20

## 2021-02-24 RX ADMIN — OXYCODONE HYDROCHLORIDE 5 MILLIGRAM(S): 5 TABLET ORAL at 22:48

## 2021-02-24 NOTE — PROGRESS NOTE ADULT - ASSESSMENT
41 yo male with PMH of obesity, diverticulosis, colonoscopy with polypectomy x 2. The had his first colonoscopy with polypectomy in June, 2020 - no malignancy or dysplasia, follow up colonoscopy on 2/9/2021 with polypectomy - also negative for malignancy or adenoma. The pt was admitted with abdominal pain.   On admission, the pt had a low grade fever of 100.4, Tmax 101.8 on 2/20, noted to have leukocytosis - 20.24 on admission, now trending down and 17.03 today.   Pt's COVID 19 testing is negative, urine and blood cultures from 2/18 with no growth, repeat blood cultures from 2/21 with no growth to date.   CT abdomen and pelvis was performed 2/18 - descending / sigmoid inflammation with pericolonic abscess. CT abdomen and pelvis was repeated on 2/21 - Persistent colitis involving the descending colon. Resolution of small pericolonic fluid collection since 2/18/2021. Persistent phlegmon, smaller since 2/18/2021.  The pt received a dose of vancomycin on admission, now on Zosyn 3.375 g IV q 8 hrs (day #5).   The pt was seen by surgery - no intervention at this time, the pt was seen by IR - no drainable collection.   On exam, the pt continues to complain of diffuse abdominal pain 4/10 in upper part of his abdomen and mild abdominal pain in his LLQ, passes flatus, had a bowel movement yesterday, complains of poor appetite, tolerates clear liquids.     Will continue with IV Zosyn, the pt is feeling better overall, no longer has fevers, abdominal pain is still there but improving, WBC trending down and cultures with  no growth.   Attending addendum 2/23:  may be able to d/c on PO antibiotics after improves on IV and able to advance diet  For now continue (Zosyn) Piperacillin/tazobactam 3.375 grams every 8 hours   pain control  advance diet     2/24: doing better overall, had a soft bowel movement earlier, less of abdominal pain, no fevers, WBC trending down, will continue Zosyn while in the hospital, will change to po abx once ready for discharge if tolerates his new diet, advanced today    1. colitis  2. pericolic collection - resolved on last CT  3. phlegmon - smaller   4. leukocytosis - decreasing   5. fevers - resolved     suggestions  - continue (Zosyn) Piperacillin/tazobactam 3.375 grams every 8 hours  for now (day #6)  - monitor pt's temperatures  - monitor WBC  - pain management   - DVT prophylaxis as per protocol   - will change abx to po if tolerates new diet and ready to be discharged

## 2021-02-24 NOTE — PROGRESS NOTE ADULT - SUBJECTIVE AND OBJECTIVE BOX
SHAY GUERRERO  MRN-[id number]    Follow Up:      Interval History: The pt was seen and examined, no distress, reports feeling much better today, had a bowel movement, soft but formed, earlier today, less of abdominal pain, no longer has diffuse upper abd tenderness. The pt is afebrile, WBC trending down, blood cultures with no growth, diet advanced - pt states that he doesn't have much of the appetite.     ROS:    [ ] Unobtainable because:  [ ] All other systems negative    Constitutional: no fever, no chills  Head: no trauma  Eyes: no vision changes, no eye pain  ENT:  no sore throat, no rhinorrhea  Cardiovascular:  no chest pain, no palpitation  Respiratory:  no SOB, no cough  GI:  + abd pain is improving, no vomiting, no diarrhea  urinary: no dysuria, no hematuria, no flank pain  musculoskeletal:  no joint pain, no joint swelling  skin:  no rash  neurology:  no headache, no seizure, no change in mental status  psych: no anxiety, no depression         Allergies  No Known Allergies        ANTIMICROBIALS:  piperacillin/tazobactam IVPB.. 3.375 every 8 hours      OTHER MEDS:  acetaminophen   Tablet .. 650 milliGRAM(s) Oral every 6 hours PRN  dextrose 5% + sodium chloride 0.9% with potassium chloride 20 mEq/L 1000 milliLiter(s) IV Continuous <Continuous>  enoxaparin Injectable 40 milliGRAM(s) SubCutaneous daily  melatonin 3 milliGRAM(s) Oral at bedtime PRN  oxyCODONE    IR 5 milliGRAM(s) Oral every 4 hours PRN  senna 2 Tablet(s) Oral at bedtime      Vital Signs Last 24 Hrs  T(C): 36.7 (24 Feb 2021 11:27), Max: 37.2 (23 Feb 2021 17:09)  T(F): 98 (24 Feb 2021 11:27), Max: 99 (23 Feb 2021 17:09)  HR: 80 (24 Feb 2021 11:27) (77 - 82)  BP: 139/88 (24 Feb 2021 11:27) (116/74 - 146/91)  BP(mean): --  RR: 18 (24 Feb 2021 11:27) (17 - 18)  SpO2: 96% (24 Feb 2021 11:27) (94% - 96%)    Physical Exam:  Constitutional: non-toxic, no distress, obese  HEAD/EYES: anicteric, no conjunctival injection  ENT:  supple, mild thrush   Cardiovascular:   normal S1, S2, no murmur, mild edema of bilateral lower extremities   Respiratory:  clear BS bilaterally, no wheezes, no rales  GI:  soft, bowel sounds are normal, not distended, very faint tenderness in LLQ  :  no robison, no CVA tenderness  Musculoskeletal:  no synovitis, normal ROM  Neurologic: awake and alert, normal strength, no focal findings  Skin:  no rash, no erythema, no phlebitis  Heme/Onc: no lymphadenopathy   Psychiatric:  awake, alert, appropriate mood    WBC Count: 16.72 K/uL (02-24 @ 06:35)  WBC Count: 17.03 K/uL (02-23 @ 06:41)  WBC Count: 18.31 K/uL (02-22 @ 07:29)  WBC Count: 24.40 K/uL (02-21 @ 08:05)  WBC Count: 23.57 K/uL (02-20 @ 07:30)  WBC Count: 21.29 K/uL (02-19 @ 07:54)  WBC Count: 20.24 K/uL (02-18 @ 15:49)                            12.2   16.72 )-----------( 505      ( 24 Feb 2021 06:35 )             38.5       02-24    139  |  104  |  6<L>  ----------------------------<  82  4.1   |  29  |  1.05    Ca    8.8      24 Feb 2021 06:35  Phos  3.7     02-24  Mg     2.6     02-24            Creatinine Trend: 1.05<--, 1.00<--, 0.95<--, 0.90<--, 0.94<--, 1.05<--      MICROBIOLOGY:  v  .Blood Blood-Peripheral  02-21-21   No growth to date.  --  --      .Blood Blood-Peripheral  02-18-21   No Growth Final  --  --      .Urine Clean Catch (Midstream)  02-18-21   <10,000 CFU/mL Normal Urogenital Loulou  --  --    RADIOLOGY:

## 2021-02-24 NOTE — PROGRESS NOTE ADULT - SUBJECTIVE AND OBJECTIVE BOX
Patient seen and examined at bedside in no acute distress.   Pt reports his abdominal pain is much improved. Now c/o constipation and sharp rectal pains.   Reports passing scant hard stool yesterday evening, +flatus. Denies bloody BMs or diarrhea.   Tolerating clears, denies nausea/vomiting.   Afebrile. Denies fevers, chills, chest pain, SOB.       Vital Signs Last 24 Hrs  T(F): 98.1 (02-24-21 @ 05:14), Max: 99.1 (02-23-21 @ 11:58)  HR: 79 (02-24-21 @ 05:14)  BP: 116/74 (02-24-21 @ 05:14)  RR: 18 (02-24-21 @ 05:14)  SpO2: 94% (02-24-21 @ 05:14)      GENERAL: Alert, NAD  CHEST/LUNG: Respirations nonlabored  HEART: Regular rate and rhythm  ABDOMEN: +Bowel sounds, soft, nondistended, no tenderness to palpation, no rebound or guarding noted   EXTREMITIES:  no calf tenderness, No edema    I&O's Detail    23 Feb 2021 07:01  -  24 Feb 2021 07:00  --------------------------------------------------------  IN:  Total IN: 0 mL    OUT:    Voided (mL): 400 mL  Total OUT: 400 mL    Total NET: -400 mL      LABS:                        12.2   16.72 )-----------( 505      ( 24 Feb 2021 06:35 )             38.5     02-24    139  |  104  |  6<L>  ----------------------------<  82  4.1   |  29  |  1.05    Ca    8.8      24 Feb 2021 06:35  Phos  3.7     02-24  Mg     2.6     02-24     Patient seen and examined at bedside in no acute distress.   Pt reports crampy abdominal pain last night, improved this morning. Now c/o constipation and sharp rectal pains.   Reports passing scant hard stool yesterday evening, +flatus. Denies bloody BMs or diarrhea.   Tolerating clears, denies nausea/vomiting.   Afebrile. Denies fevers, chills, chest pain, SOB.       Vital Signs Last 24 Hrs  T(F): 98.1 (02-24-21 @ 05:14), Max: 99.1 (02-23-21 @ 11:58)  HR: 79 (02-24-21 @ 05:14)  BP: 116/74 (02-24-21 @ 05:14)  RR: 18 (02-24-21 @ 05:14)  SpO2: 94% (02-24-21 @ 05:14)      GENERAL: Alert, NAD  CHEST/LUNG: Respirations nonlabored  HEART: Regular rate and rhythm  ABDOMEN: +Bowel sounds, soft, nondistended, no tenderness to palpation, no rebound or guarding noted   EXTREMITIES:  no calf tenderness, No edema    I&O's Detail    23 Feb 2021 07:01  -  24 Feb 2021 07:00  --------------------------------------------------------  IN:  Total IN: 0 mL    OUT:    Voided (mL): 400 mL  Total OUT: 400 mL    Total NET: -400 mL      LABS:                        12.2   16.72 )-----------( 505      ( 24 Feb 2021 06:35 )             38.5     02-24    139  |  104  |  6<L>  ----------------------------<  82  4.1   |  29  |  1.05    Ca    8.8      24 Feb 2021 06:35  Phos  3.7     02-24  Mg     2.6     02-24    Colonoscopy reports: Patient has history of diverticulosis noted on 6/3/20 report. He has had two polyps 4.9 cm and 0.9 cm removed resulting tubulovillous adenoma negative for high grade dysplasia or malignancy and polypoid colonic mucosa negative for adenoma / malignancy respectively on 6/3/20. On 2/9/21 he had repeat colonoscopy and biopsy of rectum hyperplastic polyp 0.2cm negative for adenoma or malignancy. No diverticulosis noted on this colonoscopy.      Impression: 40M PMHx colonoscopy and polypectomy approximately 6 months ago, recent repeat colonoscopy and ?polypectomy admitted with colitis and perisigmoid fluid collection 1.5 x 1.2 x 3.8 cm.   No drainable collection per IR.  Rpt CT 2/21: resolution of pericolonic collection  Downtrending leukocytosis (17>16), afebrile, abdominal pain improved. Blood cx: NGTD    Plan:  -Continue Zosyn.   -Continue clrs, may advance later today. IVF.   -Per ID, possible d/c on PO Augmentin or Vantin/Flagyl when tolerating regular diet.   -Stool softeners for constipation.   -DVT prophylaxis, OOB, Ambulating.  -F/u AM labs, continue to trend leukocytosis, replete lytes prn.  -Will discuss with Dr. Wong.

## 2021-02-25 LAB
ANION GAP SERPL CALC-SCNC: 6 MMOL/L — SIGNIFICANT CHANGE UP (ref 5–17)
BUN SERPL-MCNC: 9 MG/DL — SIGNIFICANT CHANGE UP (ref 7–23)
CALCIUM SERPL-MCNC: 8.8 MG/DL — SIGNIFICANT CHANGE UP (ref 8.5–10.1)
CHLORIDE SERPL-SCNC: 106 MMOL/L — SIGNIFICANT CHANGE UP (ref 96–108)
CO2 SERPL-SCNC: 28 MMOL/L — SIGNIFICANT CHANGE UP (ref 22–31)
CREAT SERPL-MCNC: 1.16 MG/DL — SIGNIFICANT CHANGE UP (ref 0.5–1.3)
GLUCOSE SERPL-MCNC: 108 MG/DL — HIGH (ref 70–99)
HCT VFR BLD CALC: 40 % — SIGNIFICANT CHANGE UP (ref 39–50)
HGB BLD-MCNC: 12.8 G/DL — LOW (ref 13–17)
MAGNESIUM SERPL-MCNC: 2.2 MG/DL — SIGNIFICANT CHANGE UP (ref 1.6–2.6)
MCHC RBC-ENTMCNC: 26.8 PG — LOW (ref 27–34)
MCHC RBC-ENTMCNC: 32 GM/DL — SIGNIFICANT CHANGE UP (ref 32–36)
MCV RBC AUTO: 83.9 FL — SIGNIFICANT CHANGE UP (ref 80–100)
NRBC # BLD: 0 /100 WBCS — SIGNIFICANT CHANGE UP (ref 0–0)
PHOSPHATE SERPL-MCNC: 3.6 MG/DL — SIGNIFICANT CHANGE UP (ref 2.5–4.5)
PLATELET # BLD AUTO: 586 K/UL — HIGH (ref 150–400)
POTASSIUM SERPL-MCNC: 3.6 MMOL/L — SIGNIFICANT CHANGE UP (ref 3.5–5.3)
POTASSIUM SERPL-SCNC: 3.6 MMOL/L — SIGNIFICANT CHANGE UP (ref 3.5–5.3)
RBC # BLD: 4.77 M/UL — SIGNIFICANT CHANGE UP (ref 4.2–5.8)
RBC # FLD: 13.8 % — SIGNIFICANT CHANGE UP (ref 10.3–14.5)
SODIUM SERPL-SCNC: 140 MMOL/L — SIGNIFICANT CHANGE UP (ref 135–145)
WBC # BLD: 17.04 K/UL — HIGH (ref 3.8–10.5)
WBC # FLD AUTO: 17.04 K/UL — HIGH (ref 3.8–10.5)

## 2021-02-25 PROCEDURE — 99232 SBSQ HOSP IP/OBS MODERATE 35: CPT

## 2021-02-25 RX ORDER — PIPERACILLIN AND TAZOBACTAM 4; .5 G/20ML; G/20ML
3.38 INJECTION, POWDER, LYOPHILIZED, FOR SOLUTION INTRAVENOUS EVERY 8 HOURS
Refills: 0 | Status: DISCONTINUED | OUTPATIENT
Start: 2021-02-25 | End: 2021-02-26

## 2021-02-25 RX ADMIN — PIPERACILLIN AND TAZOBACTAM 25 GRAM(S): 4; .5 INJECTION, POWDER, LYOPHILIZED, FOR SOLUTION INTRAVENOUS at 01:30

## 2021-02-25 RX ADMIN — Medication 3 MILLIGRAM(S): at 22:26

## 2021-02-25 RX ADMIN — Medication 650 MILLIGRAM(S): at 01:30

## 2021-02-25 RX ADMIN — PIPERACILLIN AND TAZOBACTAM 25 GRAM(S): 4; .5 INJECTION, POWDER, LYOPHILIZED, FOR SOLUTION INTRAVENOUS at 17:16

## 2021-02-25 RX ADMIN — PIPERACILLIN AND TAZOBACTAM 25 GRAM(S): 4; .5 INJECTION, POWDER, LYOPHILIZED, FOR SOLUTION INTRAVENOUS at 22:26

## 2021-02-25 RX ADMIN — ENOXAPARIN SODIUM 40 MILLIGRAM(S): 100 INJECTION SUBCUTANEOUS at 11:14

## 2021-02-25 RX ADMIN — PIPERACILLIN AND TAZOBACTAM 25 GRAM(S): 4; .5 INJECTION, POWDER, LYOPHILIZED, FOR SOLUTION INTRAVENOUS at 10:00

## 2021-02-25 RX ADMIN — SENNA PLUS 2 TABLET(S): 8.6 TABLET ORAL at 22:54

## 2021-02-25 NOTE — PROGRESS NOTE ADULT - SUBJECTIVE AND OBJECTIVE BOX
SURGERY PROGRESS HPI:  Pt seen and examined at bedside. Pain is well controlled on pain medication. Patient complaining of rectal pain, denies blood per rectum. No acute events overnight. Pt tolerating diet. Pt denies nausea and vomiting.  +BM/flatus. Voiding well. Pt denies chest pain, SOB, dizziness, fever, chills. Ambulating.      Vital Signs Last 24 Hrs  T(C): 36.6 (25 Feb 2021 05:30), Max: 36.9 (24 Feb 2021 17:05)  T(F): 97.9 (25 Feb 2021 05:30), Max: 98.4 (24 Feb 2021 17:05)  HR: 79 (25 Feb 2021 05:30) (74 - 80)  BP: 119/82 (25 Feb 2021 05:30) (119/82 - 140/89)  RR: 17 (25 Feb 2021 05:30) (17 - 18)  SpO2: 95% (25 Feb 2021 05:30) (95% - 98%)      PHYSICAL EXAM:    GENERAL: NAD  HEAD:  Atraumatic, Normocephalic  CHEST/LUNG: Clear to ausculation, bilaterally   HEART: RRR S1S2  ABDOMEN: non distended, +BS, soft, non tender, no guarding  EXTREMITIES:  calf soft, non tender b/l    I&O's Detail    23 Feb 2021 07:01  -  24 Feb 2021 07:00  --------------------------------------------------------  IN:  Total IN: 0 mL    OUT:    Voided (mL): 400 mL  Total OUT: 400 mL    Total NET: -400 mL          LABS:                        12.8   17.04 )-----------( 586      ( 25 Feb 2021 06:31 )             40.0     02-24    139  |  104  |  6<L>  ----------------------------<  82  4.1   |  29  |  1.05    Ca    8.8      24 Feb 2021 06:35  Phos  3.7     02-24  Mg     2.6     02-24

## 2021-02-25 NOTE — PROGRESS NOTE ADULT - ASSESSMENT
Impression: 40M PMHx colonoscopy and polypectomy approximately 6 months ago, recent repeat colonoscopy and polypectomy admitted with colitis and perisigmoid fluid collection 1.5 x 1.2 x 3.8 cm.   No drainable collection per IR.  Rpt CT 2/21: resolution of pericolonic collection  Downtrending leukocytosis (17>16), afebrile, abdominal pain improved. Blood cx: NGTD    Plan:  -Continue Zosyn.   -Continue regular diet.   -Per ID, possible d/c on PO Augmentin or Vantin/Flagyl when tolerating regular diet.   -Stool softeners for constipation.   -DVT prophylaxis, OOB, Ambulating.  -F/u AM labs, continue to trend leukocytosis, replete lytes prn.

## 2021-02-25 NOTE — PROGRESS NOTE ADULT - ASSESSMENT
41 yo male with PMH of obesity, diverticulosis, colonoscopy with polypectomy x 2. The had his first colonoscopy with polypectomy in June, 2020 - no malignancy or dysplasia, follow up colonoscopy on 2/9/2021 with polypectomy - also negative for malignancy or adenoma. The pt was admitted with abdominal pain.   On admission, the pt had a low grade fever of 100.4, Tmax 101.8 on 2/20, noted to have leukocytosis - 20.24 on admission, now trending down and 17.03 today.   Pt's COVID 19 testing is negative, urine and blood cultures from 2/18 with no growth, repeat blood cultures from 2/21 with no growth to date.   CT abdomen and pelvis was performed 2/18 - descending / sigmoid inflammation with pericolonic abscess. CT abdomen and pelvis was repeated on 2/21 - Persistent colitis involving the descending colon. Resolution of small pericolonic fluid collection since 2/18/2021. Persistent phlegmon, smaller since 2/18/2021.  The pt received a dose of vancomycin on admission, now on Zosyn 3.375 g IV q 8 hrs (day #5).   The pt was seen by surgery - no intervention at this time, the pt was seen by IR - no drainable collection.   On exam, the pt continues to complain of diffuse abdominal pain 4/10 in upper part of his abdomen and mild abdominal pain in his LLQ, passes flatus, had a bowel movement yesterday, complains of poor appetite, tolerates clear liquids.     Will continue with IV Zosyn, the pt is feeling better overall, no longer has fevers, abdominal pain is still there but improving, WBC trending down and cultures with  no growth.   Attending addendum 2/23:  may be able to d/c on PO antibiotics after improves on IV and able to advance diet  For now continue (Zosyn) Piperacillin/tazobactam 3.375 grams every 8 hours   pain control  advance diet     2/24: doing better overall, had a soft bowel movement earlier, less of abdominal pain, no fevers, WBC trending down, will continue Zosyn while in the hospital, will change to po abx once ready for discharge if tolerates his new diet, advanced today  2/25: no fevers, having bowel movements, abdominal pain is improving, has poor appetite, WBC increased, will continue Zosyn while in the hospital, will change to oral abx once ready for discharge     1. colitis  2. pericolic collection - resolved on last CT  3. phlegmon - smaller   4. leukocytosis - decreasing   5. fevers - resolved     suggestions  - continue (Zosyn) Piperacillin/tazobactam 3.375 grams every 8 hours  for now (day #7)  - monitor pt's temperatures  - monitor WBC  - pain management   - DVT prophylaxis as per protocol   - will change abx to po if tolerates new diet and ready to be discharged  39 yo male with PMH of obesity, diverticulosis, colonoscopy with polypectomy x 2. The had his first colonoscopy with polypectomy in June, 2020 - no malignancy or dysplasia, follow up colonoscopy on 2/9/2021 with polypectomy - also negative for malignancy or adenoma. The pt was admitted with abdominal pain.   On admission, the pt had a low grade fever of 100.4, Tmax 101.8 on 2/20, noted to have leukocytosis - 20.24 on admission, now trending down and 17.03 today.   Pt's COVID 19 testing is negative, urine and blood cultures from 2/18 with no growth, repeat blood cultures from 2/21 with no growth to date.   CT abdomen and pelvis was performed 2/18 - descending / sigmoid inflammation with pericolonic abscess. CT abdomen and pelvis was repeated on 2/21 - Persistent colitis involving the descending colon. Resolution of small pericolonic fluid collection since 2/18/2021. Persistent phlegmon, smaller since 2/18/2021.  The pt received a dose of vancomycin on admission, now on Zosyn 3.375 g IV q 8 hrs (day #5).   The pt was seen by surgery - no intervention at this time, the pt was seen by IR - no drainable collection.   On exam, the pt continues to complain of diffuse abdominal pain 4/10 in upper part of his abdomen and mild abdominal pain in his LLQ, passes flatus, had a bowel movement yesterday, complains of poor appetite, tolerates clear liquids.     Will continue with IV Zosyn, the pt is feeling better overall, no longer has fevers, abdominal pain is still there but improving, WBC trending down and cultures with  no growth.   Attending addendum 2/23:  may be able to d/c on PO antibiotics after improves on IV and able to advance diet  For now continue (Zosyn) Piperacillin/tazobactam 3.375 grams every 8 hours   pain control  advance diet     2/24: doing better overall, had a soft bowel movement earlier, less of abdominal pain, no fevers, WBC trending down, will continue Zosyn while in the hospital, will change to po abx once ready for discharge if tolerates his new diet, advanced today  2/25: no fevers, having bowel movements, abdominal pain is improving, has poor appetite, WBC increased, will continue Zosyn while in the hospital, will change to oral abx once ready for discharge     1. colitis  2. pericolic collection - resolved on last CT  3. phlegmon - smaller   4. leukocytosis - decreasing   5. fevers - resolved     suggestions  - continue (Zosyn) Piperacillin/tazobactam 3.375 grams every 8 hours  for now (day #7)  - monitor pt's temperatures  - monitor WBC  - pain management   - DVT prophylaxis as per protocol   - will change abx to PO Augmentin 875mg BID for a total of 2 weeks if tolerates new diet and ready to be discharged

## 2021-02-25 NOTE — PROGRESS NOTE ADULT - SUBJECTIVE AND OBJECTIVE BOX
SHAY GUERRERO  MRN-[id number]        Interval History: The pt was seen and examined, the pt is in recliner, no distress, complains of rectal pain, did not experience the pain yesterday, had a bowel movement - soft. The pt has no fevers, WBC increased to 17.04, less of abdominal pain.     ROS:    [ ] Unobtainable because:  [ ] All other systems negative    Constitutional: no fever, no chills  Head: no trauma  Eyes: no vision changes, no eye pain  ENT:  no sore throat, no rhinorrhea  Cardiovascular:  no chest pain, no palpitation  Respiratory:  no SOB, no cough  GI:  + abd pain improving, no vomiting, no diarrhea, + rectal pain  urinary: no dysuria, no hematuria, no flank pain  musculoskeletal:  no joint pain, no joint swelling  skin:  no rash  neurology:  no headache, no seizure, no change in mental status  psych: no anxiety, no depression         Allergies  No Known Allergies        ANTIMICROBIALS:  piperacillin/tazobactam IVPB.. 3.375 every 8 hours      OTHER MEDS:  acetaminophen   Tablet .. 650 milliGRAM(s) Oral every 6 hours PRN  enoxaparin Injectable 40 milliGRAM(s) SubCutaneous daily  melatonin 3 milliGRAM(s) Oral at bedtime PRN  oxyCODONE    IR 5 milliGRAM(s) Oral every 4 hours PRN  senna 2 Tablet(s) Oral at bedtime      Vital Signs Last 24 Hrs  T(C): 37 (25 Feb 2021 11:39), Max: 37 (25 Feb 2021 11:39)  T(F): 98.6 (25 Feb 2021 11:39), Max: 98.6 (25 Feb 2021 11:39)  HR: 82 (25 Feb 2021 11:39) (74 - 82)  BP: 139/91 (25 Feb 2021 11:39) (119/82 - 140/89)  BP(mean): --  RR: 18 (25 Feb 2021 11:39) (17 - 18)  SpO2: 95% (25 Feb 2021 11:39) (95% - 98%)    Physical Exam:  Constitutional: non-toxic, no distress, obese  HEAD/EYES: anicteric, no conjunctival injection  ENT:  supple, mild thrush   Cardiovascular:   normal S1, S2, no murmur, mild edema of bilateral lower extremities   Respiratory:  clear BS bilaterally, no wheezes, no rales  GI:  soft, bowel sounds are normal, not distended, LLQ is improving   :  no robison, no CVA tenderness  Musculoskeletal:  no synovitis, normal ROM  Neurologic: awake and alert, normal strength, no focal findings  Skin:  no rash, no erythema, no phlebitis  Heme/Onc: no lymphadenopathy   Psychiatric:  awake, alert, appropriate mood    WBC Count: 17.04 K/uL (02-25 @ 06:31)  WBC Count: 16.72 K/uL (02-24 @ 06:35)  WBC Count: 17.03 K/uL (02-23 @ 06:41)  WBC Count: 18.31 K/uL (02-22 @ 07:29)  WBC Count: 24.40 K/uL (02-21 @ 08:05)  WBC Count: 23.57 K/uL (02-20 @ 07:30)  WBC Count: 21.29 K/uL (02-19 @ 07:54)                            12.8   17.04 )-----------( 586      ( 25 Feb 2021 06:31 )             40.0       02-25    140  |  106  |  9   ----------------------------<  108<H>  3.6   |  28  |  1.16    Ca    8.8      25 Feb 2021 06:31  Phos  3.6     02-25  Mg     2.2     02-25            Creatinine Trend: 1.16<--, 1.05<--, 1.00<--, 0.95<--, 0.90<--, 0.94<--      MICROBIOLOGY:  v  .Blood Blood-Peripheral  02-21-21   No growth to date.  --  --      .Blood Blood-Peripheral  02-18-21   No Growth Final  --  --      .Urine Clean Catch (Midstream)  02-18-21   <10,000 CFU/mL Normal Urogenital Loulou  --  --    RADIOLOGY:

## 2021-02-25 NOTE — PROGRESS NOTE ADULT - NUTRITIONAL ASSESSMENT
This patient has been assessed with a concern for Malnutrition and has been determined to have a diagnosis/diagnoses of Morbid obesity (BMI > 40).    This patient is being managed with:   Diet Regular-  Fiber/Residue Restricted  Entered: Feb 24 2021  9:24AM

## 2021-02-25 NOTE — PROGRESS NOTE ADULT - ATTENDING COMMENTS
Mr. Ahmadi examined. Overnight pain improved however was febrile and tachycardic. On exam his abdomen is obese, soft, nondistended, mildly tender left lower quadrant. Pathology reports and colonoscopy reports obtained and reviewed. Patient has history of diverticulosis noted on 6/3/20 report. He has had two polyps 4.9 cm and 0.9 cm removed resulting tubulovillous adenoma negative for high grade dysplasia or malignancy and polypoid colonic mucosa negative for adenoma / malignancy respectively on 6/3/20.     On 2/9/21 he had repeat colonoscopy and biopsy of rectum hyperplastic polyp 0.2cm negative for adenoma or malignancy. No diverticulosis noted on this colonoscopy...     Discussed case with interventional radiology. Will continue to treat with antibiotics, bowel rest, and multimodal pain control.
Discussed with Roxanna Arango NP. I have personally seen, examined and participated in the care of this patient. I have reviewed all pertinent clinical information, including history, physical exam, plan and the NP's note and agree. Note has been reviewed and made any necessary modifications.     Change to PO Augmentin 875mg BID for a total of 2 weeks likely tomorrow   If WBC same or better tomorrow, can discharge patient on PO antibiotics     Derian Hallman DO  Cell: [phone]  Pager [phone]  Infectious Disease Attending  After 5pm/weekends please call [phone] for all inquiries and new consults

## 2021-02-26 ENCOUNTER — TRANSCRIPTION ENCOUNTER (OUTPATIENT)
Age: 41
End: 2021-02-26

## 2021-02-26 VITALS
OXYGEN SATURATION: 94 % | HEART RATE: 79 BPM | RESPIRATION RATE: 17 BRPM | SYSTOLIC BLOOD PRESSURE: 128 MMHG | DIASTOLIC BLOOD PRESSURE: 84 MMHG | TEMPERATURE: 99 F

## 2021-02-26 LAB
ANION GAP SERPL CALC-SCNC: 8 MMOL/L — SIGNIFICANT CHANGE UP (ref 5–17)
BUN SERPL-MCNC: 10 MG/DL — SIGNIFICANT CHANGE UP (ref 7–23)
CALCIUM SERPL-MCNC: 8.9 MG/DL — SIGNIFICANT CHANGE UP (ref 8.5–10.1)
CHLORIDE SERPL-SCNC: 104 MMOL/L — SIGNIFICANT CHANGE UP (ref 96–108)
CO2 SERPL-SCNC: 26 MMOL/L — SIGNIFICANT CHANGE UP (ref 22–31)
CREAT SERPL-MCNC: 1.01 MG/DL — SIGNIFICANT CHANGE UP (ref 0.5–1.3)
CULTURE RESULTS: SIGNIFICANT CHANGE UP
CULTURE RESULTS: SIGNIFICANT CHANGE UP
GLUCOSE SERPL-MCNC: 85 MG/DL — SIGNIFICANT CHANGE UP (ref 70–99)
HCT VFR BLD CALC: 38.5 % — LOW (ref 39–50)
HGB BLD-MCNC: 12.6 G/DL — LOW (ref 13–17)
MAGNESIUM SERPL-MCNC: 2.8 MG/DL — HIGH (ref 1.6–2.6)
MCHC RBC-ENTMCNC: 26.8 PG — LOW (ref 27–34)
MCHC RBC-ENTMCNC: 32.7 GM/DL — SIGNIFICANT CHANGE UP (ref 32–36)
MCV RBC AUTO: 81.9 FL — SIGNIFICANT CHANGE UP (ref 80–100)
NRBC # BLD: 0 /100 WBCS — SIGNIFICANT CHANGE UP (ref 0–0)
PHOSPHATE SERPL-MCNC: 3.2 MG/DL — SIGNIFICANT CHANGE UP (ref 2.5–4.5)
PLATELET # BLD AUTO: 641 K/UL — HIGH (ref 150–400)
POTASSIUM SERPL-MCNC: 3.9 MMOL/L — SIGNIFICANT CHANGE UP (ref 3.5–5.3)
POTASSIUM SERPL-SCNC: 3.9 MMOL/L — SIGNIFICANT CHANGE UP (ref 3.5–5.3)
RBC # BLD: 4.7 M/UL — SIGNIFICANT CHANGE UP (ref 4.2–5.8)
RBC # FLD: 13.9 % — SIGNIFICANT CHANGE UP (ref 10.3–14.5)
SODIUM SERPL-SCNC: 138 MMOL/L — SIGNIFICANT CHANGE UP (ref 135–145)
SPECIMEN SOURCE: SIGNIFICANT CHANGE UP
SPECIMEN SOURCE: SIGNIFICANT CHANGE UP
WBC # BLD: 15.63 K/UL — HIGH (ref 3.8–10.5)
WBC # FLD AUTO: 15.63 K/UL — HIGH (ref 3.8–10.5)

## 2021-02-26 PROCEDURE — 99232 SBSQ HOSP IP/OBS MODERATE 35: CPT

## 2021-02-26 RX ORDER — BACILLUS COAGULANS/INULIN 1B-250 MG
1 CAPSULE ORAL
Qty: 14 | Refills: 0
Start: 2021-02-26 | End: 2021-03-11

## 2021-02-26 RX ADMIN — PIPERACILLIN AND TAZOBACTAM 25 GRAM(S): 4; .5 INJECTION, POWDER, LYOPHILIZED, FOR SOLUTION INTRAVENOUS at 05:20

## 2021-02-26 NOTE — DISCHARGE NOTE PROVIDER - HOSPITAL COURSE
39 YO M with a sig PMHx of colonoscopy with polyp, with no sig PSHx. Patient presents with severe 10/10 sharp pain in the lower abdomen, non-radiating. He took Motrin, which did not help. Patient had a colonoscopy early February of 2021 for re-evaluation for new polyps. Patient states he last had a normal BM the day before, and a small BM in the AM on his admission date. Patient admits to occasional cigarette smoking, and ETOH on weekends. Denies CP, SOB, palpitations, nausea or vomiting.     Patient admitted to surgical service with colitis with gilberto-colic collection, started on IV antibiotics, and pain management. Patient evaluated by IR team during his stay and was found to have no drainable collection. Patient's gilberto-colic collection resolved during this admission. Patient's leukocytosis downtrending, pain well tolerated, tolerating regular diet, and ambulating on own. Patient stable for discharge per surgical standpoint.    Please follow up with Dr. Garsia in office in two weeks. Continue to take prescribed antibiotic twice a day for two weeks.      39 YO M with a sig PMHx of colonoscopy with polyp, with no sig PSHx. Patient presents with severe 10/10 sharp pain in the lower abdomen, non-radiating. He took Motrin, which did not help. Patient had a colonoscopy early February of 2021 for re-evaluation for new polyps. Patient states he last had a normal BM the day before, and a small BM in the AM on his admission date. Patient admits to occasional cigarette smoking, and ETOH on weekends. Denies CP, SOB, palpitations, nausea or vomiting.     Patient admitted to surgical service with colitis with gilberto-colic collection, started on IV antibiotics, and pain management. Patient evaluated by IR team during his stay and was found to have no drainable collection. Patient's gilberto-colic collection resolved during this admission. Patient's leukocytosis downtrending, pain well tolerated, tolerating regular diet, and ambulating on own. Patient stable for discharge per surgical standpoint.    Please follow up with Dr. Garsia in office in two weeks. Take probiotic once a day and prescribed antibiotic twice a day for two weeks. Please follow up with a Gastroenterologist for colonoscopy in 6 weeks. Continue to eat a low residue/fiber diet.

## 2021-02-26 NOTE — DISCHARGE NOTE PROVIDER - NSDCFUADDAPPT_GEN_ALL_CORE_FT
Follow up with Dr. Garsia in office in two weeks. Please call to make an appointment.  Follow up with Dr. Garsia in office in two weeks. Please call to make an appointment.   Please follow up with a Gastroenterologist for colonoscopy in 6 weeks.

## 2021-02-26 NOTE — DISCHARGE NOTE PROVIDER - NSDCMRMEDTOKEN_GEN_ALL_CORE_FT
amoxicillin-clavulanate 875 mg-125 mg oral tablet: 1 tab(s) orally 2 times a day   bacillus coagulans-inulin oral capsule: 1 cap(s) orally once a day

## 2021-02-26 NOTE — PROGRESS NOTE ADULT - SUBJECTIVE AND OBJECTIVE BOX
SHAY GUERRERO  MRN-[id number]    Interval History: The pt was seen and examined earlier, feeling better overall, had bowel  movements and more formed, states that his rectal pain has improved. The pt is afebrile, WBC decreased, blood cultures with  no growth, going home today.     ROS:    [ ] Unobtainable because:  [ ] All other systems negative    Constitutional: no fever, no chills  Head: no trauma  Eyes: no vision changes, no eye pain  ENT:  no sore throat, no rhinorrhea  Cardiovascular:  no chest pain, no palpitation  Respiratory:  no SOB, no cough  GI:  abd pain - improved , no vomiting, no diarrhea  urinary: no dysuria, no hematuria, no flank pain  musculoskeletal:  no joint pain, no joint swelling  skin:  no rash  neurology:  no headache, no seizure, no change in mental status  psych: no anxiety, no depression         Allergies  No Known Allergies        ANTIMICROBIALS:  piperacillin/tazobactam IVPB.. 3.375 every 8 hours      OTHER MEDS:  acetaminophen   Tablet .. 650 milliGRAM(s) Oral every 6 hours PRN  enoxaparin Injectable 40 milliGRAM(s) SubCutaneous daily  melatonin 3 milliGRAM(s) Oral at bedtime PRN  oxyCODONE    IR 5 milliGRAM(s) Oral every 4 hours PRN  senna 2 Tablet(s) Oral at bedtime      Vital Signs Last 24 Hrs  T(C): 37.1 (26 Feb 2021 11:31), Max: 37.1 (26 Feb 2021 11:31)  T(F): 98.7 (26 Feb 2021 11:31), Max: 98.7 (26 Feb 2021 11:31)  HR: 79 (26 Feb 2021 11:31) (78 - 82)  BP: 128/84 (26 Feb 2021 11:31) (128/84 - 146/85)  BP(mean): --  RR: 17 (26 Feb 2021 11:31) (17 - 18)  SpO2: 94% (26 Feb 2021 11:31) (94% - 100%)    Physical Exam:  Constitutional: non-toxic, no distress, obese  HEAD/EYES: anicteric, no conjunctival injection  ENT:  supple, mild thrush   Cardiovascular:   normal S1, S2, no murmur, mild edema of bilateral lower extremities   Respiratory:  clear BS bilaterally, no wheezes, no rales  GI:  soft, bowel sounds are normal, not distended, LLQ is improved   :  no robison, no CVA tenderness  Musculoskeletal:  no synovitis, normal ROM  Neurologic: awake and alert, normal strength, no focal findings  Skin:  no rash, no erythema, no phlebitis  Heme/Onc: no lymphadenopathy   Psychiatric:  awake, alert, appropriate mood      WBC Count: 15.63 K/uL (02-26 @ 06:33)  WBC Count: 17.04 K/uL (02-25 @ 06:31)  WBC Count: 16.72 K/uL (02-24 @ 06:35)  WBC Count: 17.03 K/uL (02-23 @ 06:41)  WBC Count: 18.31 K/uL (02-22 @ 07:29)  WBC Count: 24.40 K/uL (02-21 @ 08:05)  WBC Count: 23.57 K/uL (02-20 @ 07:30)                            12.6   15.63 )-----------( 641      ( 26 Feb 2021 06:33 )             38.5       02-26    138  |  104  |  10  ----------------------------<  85  3.9   |  26  |  1.01    Ca    8.9      26 Feb 2021 06:33  Phos  3.2     02-26  Mg     2.8     02-26            Creatinine Trend: 1.01<--, 1.16<--, 1.05<--, 1.00<--, 0.95<--, 0.90<--      MICROBIOLOGY:  v  .Blood Blood-Peripheral  02-21-21   No Growth Final  --  --      .Blood Blood-Peripheral  02-18-21   No Growth Final  --  --      .Urine Clean Catch (Midstream)  02-18-21   <10,000 CFU/mL Normal Urogenital Loulou  --  --                            RADIOLOGY:

## 2021-02-26 NOTE — DISCHARGE NOTE PROVIDER - NSDCFUADDINST_GEN_ALL_CORE_FT
Take prescribed antibiotics twice a day for two weeks. Take probiotic once a day and prescribed antibiotic twice a day for two weeks.

## 2021-02-26 NOTE — PROGRESS NOTE ADULT - REASON FOR ADMISSION
Colitis, with pericolic collection

## 2021-02-26 NOTE — CHART NOTE - NSCHARTNOTEFT_GEN_A_CORE
2/26/21  [address] [address]      To Whom It May Concern,   Clint Ahmadi was admitted on 2/18/21, treated, and discharged on 2/26/21 from [facility]  With activity restrictions until cleared by medical doctor.    If any questions or concerns please call.     Thanks,    Madeleine Reyes, PA-C  [phone]  Beeper # [id number]

## 2021-02-26 NOTE — DISCHARGE NOTE NURSING/CASE MANAGEMENT/SOCIAL WORK - PATIENT PORTAL LINK FT
You can access the FollowMyHealth Patient Portal offered by [facility] by registering at the following website: [email or web address]. By joining [facility]’s FollowMyHealth portal, you will also be able to view your health information using other applications (apps) compatible with our system.

## 2021-02-26 NOTE — DISCHARGE NOTE PROVIDER - CARE PROVIDER_API CALL
Jordan Garsia (MD)  Surgery  Critical Care  [address] [place] [address]  Phone: [phone]  Fax: [phone]  Follow Up Time: 2 weeks

## 2021-02-26 NOTE — PROGRESS NOTE ADULT - SUBJECTIVE AND OBJECTIVE BOX
INTERVAL HPI/OVERNIGHT EVENTS:  Pt seen and examined at bedside. Patient states he wants to go home, feeling "better." Denies abdominal pain, N/V. Tolerating diet. +BM/flatus. Voiding well.   Denies chest pain, SOB, dizziness, fever, chills. Ambulating.    Vital Signs Last 24 Hrs  T(C): 36.9 (26 Feb 2021 05:30), Max: 37 (25 Feb 2021 11:39)  T(F): 98.4 (26 Feb 2021 05:30), Max: 98.6 (25 Feb 2021 11:39)  HR: 78 (26 Feb 2021 05:30) (78 - 82)  BP: 146/85 (26 Feb 2021 05:30) (132/83 - 146/85)  RR: 18 (26 Feb 2021 05:30) (17 - 18)  SpO2: 100% (26 Feb 2021 05:30) (95% - 100%)    PHYSICAL EXAM:    GENERAL: NAD  CHEST/LUNG: Clear to ausculation, bilaterally   HEART: S1S2, RRR  ABDOMEN: non distended, +BS, soft, non tender, no guarding  EXTREMITIES:  calf soft, non tender b/l. 2+ distal pulses b/l.     I&O's Detail      LABS:                        12.8   17.04 )-----------( 586      ( 25 Feb 2021 06:31 )             40.0     02-25    140  |  106  |  9   ----------------------------<  108<H>  3.6   |  28  |  1.16    Ca    8.8      25 Feb 2021 06:31  Phos  3.6     02-25  Mg     2.2     02-25        Impression: 40M PMHx colonoscopy and polypectomy approximately 6 months ago, recent repeat colonoscopy and polypectomy admitted with colitis and perisigmoid fluid collection 1.5 x 1.2 x 3.8 cm.   No drainable collection per IR.  Rpt CT 2/21: resolution of pericolonic collection  Persistent leukocytosis, has slowly downtrended, afebrile. Clinically improving.     Plan:  -Continue IV Zosyn.   -Continue regular diet.   -Per ID, d/c on Augmentin 875mg BID for a total of 2 weeks if tolerating diet   -DVT prophylaxis, OOB, Ambulating.  -F/u AM labs  -Medical management and supportive care  -D/C planning if WBC improving  -Will d/w Surgical attending

## 2021-02-26 NOTE — DISCHARGE NOTE PROVIDER - NSDCCPCAREPLAN_GEN_ALL_CORE_FT
PRINCIPAL DISCHARGE DIAGNOSIS  Diagnosis: Pericolonic abscess  Assessment and Plan of Treatment:

## 2021-02-26 NOTE — PROGRESS NOTE ADULT - PROVIDER SPECIALTY LIST ADULT
Infectious Disease
Surgery
Infectious Disease
Surgery
Infectious Disease

## 2021-02-26 NOTE — DISCHARGE NOTE PROVIDER - DETAILS OF MALNUTRITION DIAGNOSIS/DIAGNOSES
This patient has been assessed with a concern for Malnutrition and was treated during this hospitalization for the following Nutrition diagnosis/diagnoses:     -  02/21/2021: Morbid obesity (BMI > 40)   This patient has been assessed with a concern for Malnutrition and was treated during this hospitalization for the following Nutrition diagnosis/diagnoses:     -  02/21/2021: Morbid obesity (BMI > 40)    This patient has been assessed with a concern for Malnutrition and was treated during this hospitalization for the following Nutrition diagnosis/diagnoses:     -  02/21/2021: Morbid obesity (BMI > 40)

## 2021-02-26 NOTE — PROGRESS NOTE ADULT - ASSESSMENT
41 yo male with PMH of obesity, diverticulosis, colonoscopy with polypectomy x 2. The had his first colonoscopy with polypectomy in June, 2020 - no malignancy or dysplasia, follow up colonoscopy on 2/9/2021 with polypectomy - also negative for malignancy or adenoma. The pt was admitted with abdominal pain.   On admission, the pt had a low grade fever of 100.4, Tmax 101.8 on 2/20, noted to have leukocytosis - 20.24 on admission, now trending down and 17.03 today.   Pt's COVID 19 testing is negative, urine and blood cultures from 2/18 with no growth, repeat blood cultures from 2/21 with no growth to date.   CT abdomen and pelvis was performed 2/18 - descending / sigmoid inflammation with pericolonic abscess. CT abdomen and pelvis was repeated on 2/21 - Persistent colitis involving the descending colon. Resolution of small pericolonic fluid collection since 2/18/2021. Persistent phlegmon, smaller since 2/18/2021.  The pt received a dose of vancomycin on admission, now on Zosyn 3.375 g IV q 8 hrs (day #5).   The pt was seen by surgery - no intervention at this time, the pt was seen by IR - no drainable collection.   On exam, the pt continues to complain of diffuse abdominal pain 4/10 in upper part of his abdomen and mild abdominal pain in his LLQ, passes flatus, had a bowel movement yesterday, complains of poor appetite, tolerates clear liquids.     Will continue with IV Zosyn, the pt is feeling better overall, no longer has fevers, abdominal pain is still there but improving, WBC trending down and cultures with  no growth.   Attending addendum 2/23:  may be able to d/c on PO antibiotics after improves on IV and able to advance diet  For now continue (Zosyn) Piperacillin/tazobactam 3.375 grams every 8 hours   pain control  advance diet     2/24: doing better overall, had a soft bowel movement earlier, less of abdominal pain, no fevers, WBC trending down, will continue Zosyn while in the hospital, will change to po abx once ready for discharge if tolerates his new diet, advanced today  2/25: no fevers, having bowel movements, abdominal pain is improving, has poor appetite, WBC increased, will continue Zosyn while in the hospital, will change to oral abx once ready for discharge   Attending addendum 2/25:   Change to PO Augmentin 875mg BID for a total of 2 weeks likely tomorrow   If WBC same or better tomorrow, can discharge patient on PO antibiotics     2/26: feeling better, WBC decreased, having more formed bowel movements and pt's abdominal and rectal pain improved, will be going home today on po abx, explained to the pt the importance of finishing full course of abx without skipping doses.   1. colitis  2. pericolic collection - resolved on last CT  3. phlegmon - smaller   4. leukocytosis - decreasing   5. fevers - resolved     suggestions  - continue (Zosyn) Piperacillin/tazobactam 3.375 grams every 8 hours  for now (day #8) while in the hospital  - upon discharge change abx to PO Augmentin 875mg BID for a total of 2 weeks likely today  - monitor pt's temperatures  - monitor WBC  - pain management   - DVT prophylaxis as per protocol

## 2021-02-26 NOTE — DISCHARGE NOTE NURSING/CASE MANAGEMENT/SOCIAL WORK - NSDCPNINST_GEN_ALL_CORE
Take your medications exactly as prescribed. Having your pain under control will help increase activity, improve deep breathing and coughing and prevent complications like pneumonia and blood clots in your legs. Some of the common side effects of pain medications are nausea, vomiting, itching, rash and upset stomach. Contact your doctor if you develop these or any other unusual systoms. Eat a diet rich in fiber and drink plenty of oral fluids. Use other pain management methods like, cold and warm applications, elevation of affected body part, listening to music, watching TV, yoga, etc.

## 2021-02-26 NOTE — DISCHARGE NOTE NURSING/CASE MANAGEMENT/SOCIAL WORK - NSDCFUADDAPPT_GEN_ALL_CORE_FT
Follow up with Dr. Garsia in office in two weeks. Please call to make an appointment.   Please follow up with a Gastroenterologist for colonoscopy in 6 weeks.

## 2021-03-03 PROBLEM — Z00.00 ENCOUNTER FOR PREVENTIVE HEALTH EXAMINATION: Status: ACTIVE | Noted: 2021-03-03

## 2021-03-03 PROBLEM — Z98.890 OTHER SPECIFIED POSTPROCEDURAL STATES: Chronic | Status: ACTIVE | Noted: 2021-02-18

## 2021-03-04 ENCOUNTER — APPOINTMENT (OUTPATIENT)
Dept: SURGERY | Facility: CLINIC | Age: 41
End: 2021-03-04
Payer: COMMERCIAL

## 2021-03-04 VITALS
TEMPERATURE: 98.4 F | DIASTOLIC BLOOD PRESSURE: 90 MMHG | BODY MASS INDEX: 38.1 KG/M2 | HEIGHT: 71 IN | HEART RATE: 80 BPM | OXYGEN SATURATION: 95 % | WEIGHT: 272.19 LBS | SYSTOLIC BLOOD PRESSURE: 139 MMHG

## 2021-03-04 DIAGNOSIS — Z98.890 OTHER SPECIFIED POSTPROCEDURAL STATES: ICD-10-CM

## 2021-03-04 DIAGNOSIS — R51.9 HEADACHE, UNSPECIFIED: ICD-10-CM

## 2021-03-04 DIAGNOSIS — K63.0 ABSCESS OF INTESTINE: ICD-10-CM

## 2021-03-04 DIAGNOSIS — I10 ESSENTIAL (PRIMARY) HYPERTENSION: ICD-10-CM

## 2021-03-04 DIAGNOSIS — Z86.018 PERSONAL HISTORY OF OTHER BENIGN NEOPLASM: ICD-10-CM

## 2021-03-04 DIAGNOSIS — F17.210 NICOTINE DEPENDENCE, CIGARETTES, UNCOMPLICATED: ICD-10-CM

## 2021-03-04 DIAGNOSIS — K57.32 DIVERTICULITIS OF LARGE INTESTINE W/OUT PERFORATION OR ABSCESS W/OUT BLEEDING: ICD-10-CM

## 2021-03-04 DIAGNOSIS — E66.01 MORBID (SEVERE) OBESITY DUE TO EXCESS CALORIES: ICD-10-CM

## 2021-03-04 DIAGNOSIS — K52.9 NONINFECTIVE GASTROENTERITIS AND COLITIS, UNSPECIFIED: ICD-10-CM

## 2021-03-04 PROCEDURE — 99024 POSTOP FOLLOW-UP VISIT: CPT

## 2021-03-04 RX ORDER — AMOXICILLIN AND CLAVULANATE POTASSIUM 875; 125 MG/1; MG/1
875-125 TABLET, COATED ORAL
Refills: 0 | Status: DISCONTINUED | COMMUNITY

## 2021-03-04 NOTE — ASSESSMENT
[FreeTextEntry1] : Mr. Ahmadi presents follow perforated diverticulitis with abscess. Treated with antibiotics alone. Today he complains of LUQ/back pain new onset, worse with deep breaths. His history is significant for previous colonoscopy notable for diverticulosis noted on 6/3/20 report. He has had two polyps 4.9 cm and 0.9 cm removed resulting tubulovillous adenoma negative for high grade dysplasia or malignancy and polypoid colonic mucosa negative for adenoma / malignancy respectively on 6/3/20. \par \par On 2/9/21 he had repeat colonoscopy and biopsy of rectum hyperplastic polyp 0.2cm negative for adenoma or malignancy. No diverticulosis noted on this colonoscopy. \par \par Will obtain CT Chest/abdomen/pelvis, concern for recurrent diverticulitis vs subdiaphragmatic collection. Patient has no urinary complaints. \par \par I have discussed the risks, benefits, alternatives to laparoscopic colectomy for his episode of diverticulitis. Given his most recent colonoscopy on 2/9/21 prior to hospitalization, I do not deem a repeat colonoscopy is necessary. Will discuss CT findings with him and re-visit plan for elective colectomy. If he has worsening abdominal pain he must be seen in the nearest emergency department.

## 2021-03-04 NOTE — REVIEW OF SYSTEMS
[As Noted in HPI] : as noted in HPI [Negative] : Heme/Lymph [FreeTextEntry1] : Right suprorbital headache.

## 2021-03-04 NOTE — PHYSICAL EXAM
[JVD] : no jugular venous distention  [Normal Breath Sounds] : Normal breath sounds [Respiratory Effort] : normal respiratory effort [Normal Rate and Rhythm] : normal rate and rhythm [Anxious] : anxious [Calm] : calm [de-identified] : NAD [de-identified] : Soft, nontender, nondistended. Could not elicit CVA tenderness or reproducible pain of LUQ patient is complaining

## 2021-03-04 NOTE — HISTORY OF PRESENT ILLNESS
[de-identified] : Mr. Ahmadi presents follow perforated diverticulitis with abscess.  [de-identified] : Mr. Ahmadi presents follow perforated diverticulitis with abscess. Treated with antibiotics alone. Today he complains of LUQ/back pain new onset, worse with deep breaths. His history is significant for previous colonoscopy notable for diverticulosis noted on 6/3/20 report. He has had two polyps 4.9 cm and 0.9 cm removed resulting tubulovillous adenoma negative for high grade dysplasia or malignancy and polypoid colonic mucosa negative for adenoma / malignancy respectively on 6/3/20. \par \par On 2/9/21 he had repeat colonoscopy and biopsy of rectum hyperplastic polyp 0.2cm negative for adenoma or malignancy. No diverticulosis noted on this colonoscopy. \par \par Currently his 10 pt ROS OTW negative except for above. \par \par

## 2021-03-09 ENCOUNTER — APPOINTMENT (OUTPATIENT)
Dept: CT IMAGING | Facility: CLINIC | Age: 41
End: 2021-03-09
Payer: COMMERCIAL

## 2021-03-09 ENCOUNTER — OUTPATIENT (OUTPATIENT)
Dept: OUTPATIENT SERVICES | Facility: HOSPITAL | Age: 41
LOS: 1 days | End: 2021-03-09
Payer: COMMERCIAL

## 2021-03-09 DIAGNOSIS — K57.32 DIVERTICULITIS OF LARGE INTESTINE WITHOUT PERFORATION OR ABSCESS WITHOUT BLEEDING: ICD-10-CM

## 2021-03-09 PROCEDURE — 74177 CT ABD & PELVIS W/CONTRAST: CPT | Mod: 26

## 2021-03-09 PROCEDURE — 74177 CT ABD & PELVIS W/CONTRAST: CPT

## 2022-05-16 ENCOUNTER — NON-APPOINTMENT (OUTPATIENT)
Age: 42
End: 2022-05-16

## 2022-10-25 ENCOUNTER — NON-APPOINTMENT (OUTPATIENT)
Age: 42
End: 2022-10-25

## 2022-11-18 NOTE — DIETITIAN INITIAL EVALUATION ADULT. - CONTINUE CURRENT NUTRITION CARE PLAN
Patient's FSGs are uncontrolled due to hyperglycemia on current medication regimen.  Last A1c reviewed-   Lab Results   Component Value Date    HGBA1C 5.7 (H) 11/02/2022     Most recent fingerstick glucose reviewed-   No results for input(s): POCTGLUCOSE in the last 24 hours.  Current correctional scale  Low  Maintain anti-hyperglycemic dose as follows-   Antihyperglycemics (From admission, onward)    None        Hold Oral hypoglycemics while patient is in the hospital.   yes

## 2022-11-28 ENCOUNTER — NON-APPOINTMENT (OUTPATIENT)
Age: 42
End: 2022-11-28

## 2023-10-25 ENCOUNTER — EMERGENCY (EMERGENCY)
Facility: HOSPITAL | Age: 43
LOS: 0 days | Discharge: HOME | End: 2023-10-25
Attending: STUDENT IN AN ORGANIZED HEALTH CARE EDUCATION/TRAINING PROGRAM
Payer: COMMERCIAL

## 2023-10-25 VITALS
OXYGEN SATURATION: 98 % | HEART RATE: 80 BPM | SYSTOLIC BLOOD PRESSURE: 125 MMHG | WEIGHT: 285.06 LBS | DIASTOLIC BLOOD PRESSURE: 86 MMHG | HEIGHT: 71 IN | TEMPERATURE: 99 F | RESPIRATION RATE: 20 BRPM

## 2023-10-25 VITALS
RESPIRATION RATE: 17 BRPM | SYSTOLIC BLOOD PRESSURE: 153 MMHG | TEMPERATURE: 98 F | OXYGEN SATURATION: 98 % | DIASTOLIC BLOOD PRESSURE: 98 MMHG | HEART RATE: 81 BPM

## 2023-10-25 DIAGNOSIS — R11.0 NAUSEA: ICD-10-CM

## 2023-10-25 DIAGNOSIS — G56.20 LESION OF ULNAR NERVE, UNSPECIFIED UPPER LIMB: ICD-10-CM

## 2023-10-25 DIAGNOSIS — R20.0 ANESTHESIA OF SKIN: ICD-10-CM

## 2023-10-25 DIAGNOSIS — Z87.19 PERSONAL HISTORY OF OTHER DISEASES OF THE DIGESTIVE SYSTEM: ICD-10-CM

## 2023-10-25 DIAGNOSIS — K52.9 NONINFECTIVE GASTROENTERITIS AND COLITIS, UNSPECIFIED: ICD-10-CM

## 2023-10-25 DIAGNOSIS — I10 ESSENTIAL (PRIMARY) HYPERTENSION: ICD-10-CM

## 2023-10-25 DIAGNOSIS — R10.32 LEFT LOWER QUADRANT PAIN: ICD-10-CM

## 2023-10-25 LAB
ALBUMIN SERPL ELPH-MCNC: 3.5 G/DL — SIGNIFICANT CHANGE UP (ref 3.3–5)
ALBUMIN SERPL ELPH-MCNC: 3.5 G/DL — SIGNIFICANT CHANGE UP (ref 3.3–5)
ALP SERPL-CCNC: 101 U/L — SIGNIFICANT CHANGE UP (ref 40–120)
ALP SERPL-CCNC: 101 U/L — SIGNIFICANT CHANGE UP (ref 40–120)
ALT FLD-CCNC: 48 U/L — SIGNIFICANT CHANGE UP (ref 12–78)
ALT FLD-CCNC: 48 U/L — SIGNIFICANT CHANGE UP (ref 12–78)
ANION GAP SERPL CALC-SCNC: 5 MMOL/L — SIGNIFICANT CHANGE UP (ref 5–17)
ANION GAP SERPL CALC-SCNC: 5 MMOL/L — SIGNIFICANT CHANGE UP (ref 5–17)
APPEARANCE UR: CLEAR — SIGNIFICANT CHANGE UP
APPEARANCE UR: CLEAR — SIGNIFICANT CHANGE UP
AST SERPL-CCNC: 25 U/L — SIGNIFICANT CHANGE UP (ref 15–37)
AST SERPL-CCNC: 25 U/L — SIGNIFICANT CHANGE UP (ref 15–37)
BASOPHILS # BLD AUTO: 0.03 K/UL — SIGNIFICANT CHANGE UP (ref 0–0.2)
BASOPHILS # BLD AUTO: 0.03 K/UL — SIGNIFICANT CHANGE UP (ref 0–0.2)
BASOPHILS NFR BLD AUTO: 0.4 % — SIGNIFICANT CHANGE UP (ref 0–2)
BASOPHILS NFR BLD AUTO: 0.4 % — SIGNIFICANT CHANGE UP (ref 0–2)
BILIRUB SERPL-MCNC: 0.6 MG/DL — SIGNIFICANT CHANGE UP (ref 0.2–1.2)
BILIRUB SERPL-MCNC: 0.6 MG/DL — SIGNIFICANT CHANGE UP (ref 0.2–1.2)
BILIRUB UR-MCNC: NEGATIVE — SIGNIFICANT CHANGE UP
BILIRUB UR-MCNC: NEGATIVE — SIGNIFICANT CHANGE UP
BUN SERPL-MCNC: 16 MG/DL — SIGNIFICANT CHANGE UP (ref 7–23)
BUN SERPL-MCNC: 16 MG/DL — SIGNIFICANT CHANGE UP (ref 7–23)
CALCIUM SERPL-MCNC: 8.5 MG/DL — SIGNIFICANT CHANGE UP (ref 8.5–10.1)
CALCIUM SERPL-MCNC: 8.5 MG/DL — SIGNIFICANT CHANGE UP (ref 8.5–10.1)
CHLORIDE SERPL-SCNC: 107 MMOL/L — SIGNIFICANT CHANGE UP (ref 96–108)
CHLORIDE SERPL-SCNC: 107 MMOL/L — SIGNIFICANT CHANGE UP (ref 96–108)
CO2 SERPL-SCNC: 28 MMOL/L — SIGNIFICANT CHANGE UP (ref 22–31)
CO2 SERPL-SCNC: 28 MMOL/L — SIGNIFICANT CHANGE UP (ref 22–31)
COLOR SPEC: YELLOW — SIGNIFICANT CHANGE UP
COLOR SPEC: YELLOW — SIGNIFICANT CHANGE UP
CREAT SERPL-MCNC: 1.1 MG/DL — SIGNIFICANT CHANGE UP (ref 0.5–1.3)
CREAT SERPL-MCNC: 1.1 MG/DL — SIGNIFICANT CHANGE UP (ref 0.5–1.3)
DIFF PNL FLD: ABNORMAL
DIFF PNL FLD: ABNORMAL
EGFR: 85 ML/MIN/1.73M2 — SIGNIFICANT CHANGE UP
EGFR: 85 ML/MIN/1.73M2 — SIGNIFICANT CHANGE UP
EOSINOPHIL # BLD AUTO: 0.12 K/UL — SIGNIFICANT CHANGE UP (ref 0–0.5)
EOSINOPHIL # BLD AUTO: 0.12 K/UL — SIGNIFICANT CHANGE UP (ref 0–0.5)
EOSINOPHIL NFR BLD AUTO: 1.7 % — SIGNIFICANT CHANGE UP (ref 0–6)
EOSINOPHIL NFR BLD AUTO: 1.7 % — SIGNIFICANT CHANGE UP (ref 0–6)
EPI CELLS # UR: SIGNIFICANT CHANGE UP
EPI CELLS # UR: SIGNIFICANT CHANGE UP
GLUCOSE SERPL-MCNC: 102 MG/DL — HIGH (ref 70–99)
GLUCOSE SERPL-MCNC: 102 MG/DL — HIGH (ref 70–99)
GLUCOSE UR QL: NEGATIVE MG/DL — SIGNIFICANT CHANGE UP
GLUCOSE UR QL: NEGATIVE MG/DL — SIGNIFICANT CHANGE UP
HCT VFR BLD CALC: 42.9 % — SIGNIFICANT CHANGE UP (ref 39–50)
HCT VFR BLD CALC: 42.9 % — SIGNIFICANT CHANGE UP (ref 39–50)
HGB BLD-MCNC: 14 G/DL — SIGNIFICANT CHANGE UP (ref 13–17)
HGB BLD-MCNC: 14 G/DL — SIGNIFICANT CHANGE UP (ref 13–17)
IMM GRANULOCYTES NFR BLD AUTO: 0.3 % — SIGNIFICANT CHANGE UP (ref 0–0.9)
IMM GRANULOCYTES NFR BLD AUTO: 0.3 % — SIGNIFICANT CHANGE UP (ref 0–0.9)
KETONES UR-MCNC: NEGATIVE — SIGNIFICANT CHANGE UP
KETONES UR-MCNC: NEGATIVE — SIGNIFICANT CHANGE UP
LEUKOCYTE ESTERASE UR-ACNC: NEGATIVE — SIGNIFICANT CHANGE UP
LEUKOCYTE ESTERASE UR-ACNC: NEGATIVE — SIGNIFICANT CHANGE UP
LYMPHOCYTES # BLD AUTO: 1.93 K/UL — SIGNIFICANT CHANGE UP (ref 1–3.3)
LYMPHOCYTES # BLD AUTO: 1.93 K/UL — SIGNIFICANT CHANGE UP (ref 1–3.3)
LYMPHOCYTES # BLD AUTO: 26.9 % — SIGNIFICANT CHANGE UP (ref 13–44)
LYMPHOCYTES # BLD AUTO: 26.9 % — SIGNIFICANT CHANGE UP (ref 13–44)
MCHC RBC-ENTMCNC: 27.7 PG — SIGNIFICANT CHANGE UP (ref 27–34)
MCHC RBC-ENTMCNC: 27.7 PG — SIGNIFICANT CHANGE UP (ref 27–34)
MCHC RBC-ENTMCNC: 32.6 G/DL — SIGNIFICANT CHANGE UP (ref 32–36)
MCHC RBC-ENTMCNC: 32.6 G/DL — SIGNIFICANT CHANGE UP (ref 32–36)
MCV RBC AUTO: 84.8 FL — SIGNIFICANT CHANGE UP (ref 80–100)
MCV RBC AUTO: 84.8 FL — SIGNIFICANT CHANGE UP (ref 80–100)
MONOCYTES # BLD AUTO: 0.74 K/UL — SIGNIFICANT CHANGE UP (ref 0–0.9)
MONOCYTES # BLD AUTO: 0.74 K/UL — SIGNIFICANT CHANGE UP (ref 0–0.9)
MONOCYTES NFR BLD AUTO: 10.3 % — SIGNIFICANT CHANGE UP (ref 2–14)
MONOCYTES NFR BLD AUTO: 10.3 % — SIGNIFICANT CHANGE UP (ref 2–14)
NEUTROPHILS # BLD AUTO: 4.34 K/UL — SIGNIFICANT CHANGE UP (ref 1.8–7.4)
NEUTROPHILS # BLD AUTO: 4.34 K/UL — SIGNIFICANT CHANGE UP (ref 1.8–7.4)
NEUTROPHILS NFR BLD AUTO: 60.4 % — SIGNIFICANT CHANGE UP (ref 43–77)
NEUTROPHILS NFR BLD AUTO: 60.4 % — SIGNIFICANT CHANGE UP (ref 43–77)
NITRITE UR-MCNC: NEGATIVE — SIGNIFICANT CHANGE UP
NITRITE UR-MCNC: NEGATIVE — SIGNIFICANT CHANGE UP
NRBC # BLD: 0 /100 WBCS — SIGNIFICANT CHANGE UP (ref 0–0)
NRBC # BLD: 0 /100 WBCS — SIGNIFICANT CHANGE UP (ref 0–0)
PH UR: 6 — SIGNIFICANT CHANGE UP (ref 5–8)
PH UR: 6 — SIGNIFICANT CHANGE UP (ref 5–8)
PLATELET # BLD AUTO: 312 K/UL — SIGNIFICANT CHANGE UP (ref 150–400)
PLATELET # BLD AUTO: 312 K/UL — SIGNIFICANT CHANGE UP (ref 150–400)
POTASSIUM SERPL-MCNC: 4.1 MMOL/L — SIGNIFICANT CHANGE UP (ref 3.5–5.3)
POTASSIUM SERPL-MCNC: 4.1 MMOL/L — SIGNIFICANT CHANGE UP (ref 3.5–5.3)
POTASSIUM SERPL-SCNC: 4.1 MMOL/L — SIGNIFICANT CHANGE UP (ref 3.5–5.3)
POTASSIUM SERPL-SCNC: 4.1 MMOL/L — SIGNIFICANT CHANGE UP (ref 3.5–5.3)
PROT SERPL-MCNC: 7.1 GM/DL — SIGNIFICANT CHANGE UP (ref 6–8.3)
PROT SERPL-MCNC: 7.1 GM/DL — SIGNIFICANT CHANGE UP (ref 6–8.3)
PROT UR-MCNC: 30 MG/DL
PROT UR-MCNC: 30 MG/DL
RBC # BLD: 5.06 M/UL — SIGNIFICANT CHANGE UP (ref 4.2–5.8)
RBC # BLD: 5.06 M/UL — SIGNIFICANT CHANGE UP (ref 4.2–5.8)
RBC # FLD: 14.3 % — SIGNIFICANT CHANGE UP (ref 10.3–14.5)
RBC # FLD: 14.3 % — SIGNIFICANT CHANGE UP (ref 10.3–14.5)
RBC CASTS # UR COMP ASSIST: ABNORMAL /HPF (ref 0–4)
RBC CASTS # UR COMP ASSIST: ABNORMAL /HPF (ref 0–4)
SODIUM SERPL-SCNC: 140 MMOL/L — SIGNIFICANT CHANGE UP (ref 135–145)
SODIUM SERPL-SCNC: 140 MMOL/L — SIGNIFICANT CHANGE UP (ref 135–145)
SP GR SPEC: 1.01 — SIGNIFICANT CHANGE UP (ref 1.01–1.02)
SP GR SPEC: 1.01 — SIGNIFICANT CHANGE UP (ref 1.01–1.02)
UROBILINOGEN FLD QL: NEGATIVE MG/DL — SIGNIFICANT CHANGE UP
UROBILINOGEN FLD QL: NEGATIVE MG/DL — SIGNIFICANT CHANGE UP
WBC # BLD: 7.18 K/UL — SIGNIFICANT CHANGE UP (ref 3.8–10.5)
WBC # BLD: 7.18 K/UL — SIGNIFICANT CHANGE UP (ref 3.8–10.5)
WBC # FLD AUTO: 7.18 K/UL — SIGNIFICANT CHANGE UP (ref 3.8–10.5)
WBC # FLD AUTO: 7.18 K/UL — SIGNIFICANT CHANGE UP (ref 3.8–10.5)
WBC UR QL: SIGNIFICANT CHANGE UP
WBC UR QL: SIGNIFICANT CHANGE UP

## 2023-10-25 PROCEDURE — 99285 EMERGENCY DEPT VISIT HI MDM: CPT

## 2023-10-25 PROCEDURE — 74177 CT ABD & PELVIS W/CONTRAST: CPT | Mod: 26,MC

## 2023-10-25 RX ORDER — KETOROLAC TROMETHAMINE 30 MG/ML
15 SYRINGE (ML) INJECTION ONCE
Refills: 0 | Status: DISCONTINUED | OUTPATIENT
Start: 2023-10-25 | End: 2023-10-25

## 2023-10-25 RX ADMIN — Medication 15 MILLIGRAM(S): at 16:30

## 2023-10-25 RX ADMIN — Medication 15 MILLIGRAM(S): at 16:08

## 2023-10-25 NOTE — ED ADULT NURSE NOTE - NSFALLUNIVINTERV_ED_ALL_ED
Bed/Stretcher in lowest position, wheels locked, appropriate side rails in place/Call bell, personal items and telephone in reach/Instruct patient to call for assistance before getting out of bed/chair/stretcher/Non-slip footwear applied when patient is off stretcher/[place] to call system/Physically safe environment - no spills, clutter or unnecessary equipment/Purposeful proactive rounding/Room/bathroom lighting operational, light cord in reach

## 2023-10-25 NOTE — ED PROVIDER NOTE - CARE PROVIDER_API CALL
Evette Robbins  Plastic Surgery  [address], [address]  [place], [place] [address]  Phone: [phone]  Fax: [phone]  Follow Up Time: 7-10 Days    Tiago Bose  Gastroenterology  [address]  [place] [address]  Phone: [phone]  Fax: [phone]  Follow Up Time: 7-10 Days

## 2023-10-25 NOTE — ED ADULT TRIAGE NOTE - CHIEF COMPLAINT QUOTE
LLQ pain H/O Diverticulitis  & Right hand 5th digit numbness, pain  x 2 weeks H/O Diverticulitis HTN

## 2023-10-25 NOTE — ED ADULT NURSE NOTE - OBJECTIVE STATEMENT
Pt AOx4 and ambulatory with steady gait c/o LLQ pain for past 2 days. Pt reports PMH of Diverticulitis and HTN. Pt states pain feels similar to when he had diverticulitis. Pt denies radiation of pain to b/l UE and b/l LE. Pt denies any recent trauma, falls, or LOC. Pt denies SOB, fever/chills, HA/dizziness, chest pain, or dysuria.

## 2023-10-25 NOTE — ED PROVIDER NOTE - PATIENT PORTAL LINK FT
You can access the FollowMyHealth Patient Portal offered by [facility] by registering at the following website: [email or web address]. By joining [employer]’s FollowMyHealth portal, you will also be able to view your health information using other applications (apps) compatible with our system.

## 2023-10-25 NOTE — ED PROVIDER NOTE - CLINICAL SUMMARY MEDICAL DECISION MAKING FREE TEXT BOX
43M PMH HTN, hx diverticulitis pw 2 days constant, non-radiating, pressure-like LLQ pain & 1-2mo R pinky finger numbness. Afebrile, VSS. Well appearing, in NAD. Plan for CBC, CMP, UA/C, CT AP, give Toradol, r/o diverticulitis. R hand symptoms most c/w ulnar radiculopathy, will recommend outpatient Hand w/u. 43M PMH HTN, hx diverticulitis pw 2 days constant, non-radiating, pressure-like LLQ pain & 1-2mo R pinky finger numbness. Afebrile, VSS. Well appearing, in NAD. Plan for CBC, CMP, UA/C, CT AP, give Toradol, r/o diverticulitis. R hand symptoms most c/w ulnar neuropathy, will recommend outpatient Hand w/u.  W/u significant for: CT AP w/ resolved diverticulitis, ? mild focal colitis. On re-eval, pt resting comfortably, in NAD. Stable for d/c home. Given script for Augmentin. Given / instructed for close outpatient GI, Hand, PCP f/u. Return signs / symptoms d/w pt. He understands / agrees w/ this plan.

## 2023-10-25 NOTE — ED PROVIDER NOTE - CARE PLAN
1 Principal Discharge DX:	Diverticulitis   Principal Discharge DX:	Diverticulitis  Secondary Diagnosis:	Ulnar neuropathy   Principal Discharge DX:	Colitis  Secondary Diagnosis:	Ulnar neuropathy

## 2023-10-25 NOTE — ED PROVIDER NOTE - PROVIDER TOKENS
PROVIDER:[TOKEN:[[id number]],FOLLOWUP:[7-10 Days]],PROVIDER:[TOKEN:[[id number]],FOLLOWUP:[7-10 Days]]

## 2023-10-25 NOTE — ED PROVIDER NOTE - OBJECTIVE STATEMENT
43M PMH HTN pw 2 days of constant, non-radiating, pressure-like LLQ pain. Pt reports hx similar in past, diagnosed w/ & admitted for diverticulitis. Pt reports pain was more severe at that time, but pt is hoping to catch infection before it becomes too bad again. Pt endorses increased tightness, pain w/ sitting up, wearing seatbelt & going over bumps in car. + mild nausea. Denies F/C, h/a, neck pain, dizziness, CP, SOB, cough, flank pain, vomiting, dysuria, hematuria, constipation, LE pain / swelling. Pt endorses increased, soft, non-bloody BMs. Pt also reports constant R pinky finger numbness x1-2mo, constant. Pt denies injury / pain / weakness. Pt is R hand dominant. Pt saw PCP, told symptoms could be 2/2 arthritis.     PMH as above, PSH none, NKDA, meds as listed.

## 2023-10-26 LAB
CULTURE RESULTS: SIGNIFICANT CHANGE UP
CULTURE RESULTS: SIGNIFICANT CHANGE UP
SPECIMEN SOURCE: SIGNIFICANT CHANGE UP
SPECIMEN SOURCE: SIGNIFICANT CHANGE UP

## 2023-11-15 ENCOUNTER — APPOINTMENT (OUTPATIENT)
Dept: BARIATRICS | Facility: CLINIC | Age: 43
End: 2023-11-15

## 2024-08-28 NOTE — CONSULT NOTE ADULT - REASON FOR ADMISSION
----- Message from Julio Alcazar sent at 8/28/2024  3:46 PM CDT -----  Good afternoon,     The pt listed above is being referred from [facility] for (Screen for colon cancer). The external referral has been transcribed into the chart. Please advise or contact pt to schedule appt at your earliest convenience.     Thank You,     Julio Alcazar  [facility]   Colitis, with pericolic collection

## 2024-10-06 ENCOUNTER — EMERGENCY (EMERGENCY)
Facility: HOSPITAL | Age: 44
LOS: 0 days | Discharge: HOME | End: 2024-10-07
Attending: STUDENT IN AN ORGANIZED HEALTH CARE EDUCATION/TRAINING PROGRAM
Payer: COMMERCIAL

## 2024-10-06 VITALS
DIASTOLIC BLOOD PRESSURE: 75 MMHG | WEIGHT: 283.07 LBS | TEMPERATURE: 98 F | OXYGEN SATURATION: 95 % | HEIGHT: 71 IN | HEART RATE: 83 BPM | SYSTOLIC BLOOD PRESSURE: 112 MMHG | RESPIRATION RATE: 17 BRPM

## 2024-10-06 DIAGNOSIS — Z87.19 PERSONAL HISTORY OF OTHER DISEASES OF THE DIGESTIVE SYSTEM: ICD-10-CM

## 2024-10-06 DIAGNOSIS — R10.12 LEFT UPPER QUADRANT PAIN: ICD-10-CM

## 2024-10-06 DIAGNOSIS — I10 ESSENTIAL (PRIMARY) HYPERTENSION: ICD-10-CM

## 2024-10-06 DIAGNOSIS — R10.32 LEFT LOWER QUADRANT PAIN: ICD-10-CM

## 2024-10-06 PROCEDURE — 99285 EMERGENCY DEPT VISIT HI MDM: CPT

## 2024-10-07 VITALS
HEART RATE: 61 BPM | DIASTOLIC BLOOD PRESSURE: 67 MMHG | OXYGEN SATURATION: 98 % | SYSTOLIC BLOOD PRESSURE: 100 MMHG | TEMPERATURE: 98 F | RESPIRATION RATE: 17 BRPM

## 2024-10-07 LAB
ALBUMIN SERPL ELPH-MCNC: 3.3 G/DL — SIGNIFICANT CHANGE UP (ref 3.3–5)
ALP SERPL-CCNC: 77 U/L — SIGNIFICANT CHANGE UP (ref 40–120)
ALT FLD-CCNC: 64 U/L — SIGNIFICANT CHANGE UP (ref 12–78)
ANION GAP SERPL CALC-SCNC: 5 MMOL/L — SIGNIFICANT CHANGE UP (ref 5–17)
APPEARANCE UR: CLEAR — SIGNIFICANT CHANGE UP
AST SERPL-CCNC: 26 U/L — SIGNIFICANT CHANGE UP (ref 15–37)
BASOPHILS # BLD AUTO: 0.04 K/UL — SIGNIFICANT CHANGE UP (ref 0–0.2)
BASOPHILS NFR BLD AUTO: 0.4 % — SIGNIFICANT CHANGE UP (ref 0–2)
BILIRUB SERPL-MCNC: 0.7 MG/DL — SIGNIFICANT CHANGE UP (ref 0.2–1.2)
BILIRUB UR-MCNC: NEGATIVE — SIGNIFICANT CHANGE UP
BUN SERPL-MCNC: 18 MG/DL — SIGNIFICANT CHANGE UP (ref 7–23)
CALCIUM SERPL-MCNC: 8.9 MG/DL — SIGNIFICANT CHANGE UP (ref 8.5–10.1)
CHLORIDE SERPL-SCNC: 108 MMOL/L — SIGNIFICANT CHANGE UP (ref 96–108)
CO2 SERPL-SCNC: 28 MMOL/L — SIGNIFICANT CHANGE UP (ref 22–31)
COLOR SPEC: YELLOW — SIGNIFICANT CHANGE UP
CREAT SERPL-MCNC: 1.12 MG/DL — SIGNIFICANT CHANGE UP (ref 0.5–1.3)
DIFF PNL FLD: NEGATIVE — SIGNIFICANT CHANGE UP
EGFR: 83 ML/MIN/1.73M2 — SIGNIFICANT CHANGE UP
EOSINOPHIL # BLD AUTO: 0.08 K/UL — SIGNIFICANT CHANGE UP (ref 0–0.5)
EOSINOPHIL NFR BLD AUTO: 0.8 % — SIGNIFICANT CHANGE UP (ref 0–6)
FLUAV AG NPH QL: SIGNIFICANT CHANGE UP
FLUBV AG NPH QL: SIGNIFICANT CHANGE UP
GLUCOSE SERPL-MCNC: 103 MG/DL — HIGH (ref 70–99)
GLUCOSE UR QL: NEGATIVE MG/DL — SIGNIFICANT CHANGE UP
HCT VFR BLD CALC: 40.2 % — SIGNIFICANT CHANGE UP (ref 39–50)
HGB BLD-MCNC: 13.5 G/DL — SIGNIFICANT CHANGE UP (ref 13–17)
IMM GRANULOCYTES NFR BLD AUTO: 0.2 % — SIGNIFICANT CHANGE UP (ref 0–0.9)
KETONES UR-MCNC: ABNORMAL MG/DL
LACTATE SERPL-SCNC: 0.7 MMOL/L — SIGNIFICANT CHANGE UP (ref 0.7–2)
LEUKOCYTE ESTERASE UR-ACNC: NEGATIVE — SIGNIFICANT CHANGE UP
LIDOCAIN IGE QN: 41 U/L — SIGNIFICANT CHANGE UP (ref 13–75)
LYMPHOCYTES # BLD AUTO: 2.62 K/UL — SIGNIFICANT CHANGE UP (ref 1–3.3)
LYMPHOCYTES # BLD AUTO: 27.7 % — SIGNIFICANT CHANGE UP (ref 13–44)
MAGNESIUM SERPL-MCNC: 2.4 MG/DL — SIGNIFICANT CHANGE UP (ref 1.6–2.6)
MCHC RBC-ENTMCNC: 28.8 PG — SIGNIFICANT CHANGE UP (ref 27–34)
MCHC RBC-ENTMCNC: 33.6 G/DL — SIGNIFICANT CHANGE UP (ref 32–36)
MCV RBC AUTO: 85.9 FL — SIGNIFICANT CHANGE UP (ref 80–100)
MONOCYTES # BLD AUTO: 1.05 K/UL — HIGH (ref 0–0.9)
MONOCYTES NFR BLD AUTO: 11.1 % — SIGNIFICANT CHANGE UP (ref 2–14)
NEUTROPHILS # BLD AUTO: 5.64 K/UL — SIGNIFICANT CHANGE UP (ref 1.8–7.4)
NEUTROPHILS NFR BLD AUTO: 59.8 % — SIGNIFICANT CHANGE UP (ref 43–77)
NITRITE UR-MCNC: NEGATIVE — SIGNIFICANT CHANGE UP
NRBC # BLD: 0 /100 WBCS — SIGNIFICANT CHANGE UP (ref 0–0)
PH UR: 5.5 — SIGNIFICANT CHANGE UP (ref 5–8)
PLATELET # BLD AUTO: 293 K/UL — SIGNIFICANT CHANGE UP (ref 150–400)
POTASSIUM SERPL-MCNC: 3.7 MMOL/L — SIGNIFICANT CHANGE UP (ref 3.5–5.3)
POTASSIUM SERPL-SCNC: 3.7 MMOL/L — SIGNIFICANT CHANGE UP (ref 3.5–5.3)
PROT SERPL-MCNC: 6.7 GM/DL — SIGNIFICANT CHANGE UP (ref 6–8.3)
PROT UR-MCNC: NEGATIVE MG/DL — SIGNIFICANT CHANGE UP
RBC # BLD: 4.68 M/UL — SIGNIFICANT CHANGE UP (ref 4.2–5.8)
RBC # FLD: 14.2 % — SIGNIFICANT CHANGE UP (ref 10.3–14.5)
SARS-COV-2 RNA SPEC QL NAA+PROBE: SIGNIFICANT CHANGE UP
SODIUM SERPL-SCNC: 141 MMOL/L — SIGNIFICANT CHANGE UP (ref 135–145)
SP GR SPEC: 1.02 — SIGNIFICANT CHANGE UP (ref 1–1.03)
UROBILINOGEN FLD QL: 0.2 MG/DL — SIGNIFICANT CHANGE UP (ref 0.2–1)
WBC # BLD: 9.45 K/UL — SIGNIFICANT CHANGE UP (ref 3.8–10.5)
WBC # FLD AUTO: 9.45 K/UL — SIGNIFICANT CHANGE UP (ref 3.8–10.5)

## 2024-10-07 PROCEDURE — 74177 CT ABD & PELVIS W/CONTRAST: CPT | Mod: 26,MC

## 2024-10-07 RX ORDER — SODIUM CHLORIDE 0.9 % (FLUSH) 0.9 %
1000 SYRINGE (ML) INJECTION ONCE
Refills: 0 | Status: COMPLETED | OUTPATIENT
Start: 2024-10-07 | End: 2024-10-07

## 2024-10-07 RX ORDER — ACETAMINOPHEN 325 MG
1000 TABLET ORAL ONCE
Refills: 0 | Status: COMPLETED | OUTPATIENT
Start: 2024-10-07 | End: 2024-10-07

## 2024-10-07 RX ADMIN — Medication 400 MILLIGRAM(S): at 03:19

## 2024-10-07 RX ADMIN — Medication 1000 MILLILITER(S): at 03:19

## 2024-10-07 NOTE — ED PROVIDER NOTE - PRO INTERPRETER NEED 2
Patient called to schedule PFT's.   Was told that her order was  or cancelled?     Order cued. Please sign if appropriate.     Thank you,   Kalen BRIONES RN  [facility]       English

## 2024-10-07 NOTE — ED PROVIDER NOTE - PATIENT PORTAL LINK FT
You can access the FollowMyHealth Patient Portal offered by [facility] by registering at the following website: [email or web address]. By joining [employer]’s FollowMyHealth portal, you will also be able to view your health information using other applications (apps) compatible with our system.

## 2024-10-07 NOTE — ED ADULT NURSE REASSESSMENT NOTE - NS ED NURSE REASSESS COMMENT FT1
Patient received in bed sleeping, easily awaken, denies any pains at this time, ambulating with steady gait to bathroom. Made aware he is waiting for d/c papers and that MD will review his test results with him prior to d/c. He states understanding. Nutritional service contacted for breakfast order.

## 2024-10-07 NOTE — ED PROVIDER NOTE - NSFOLLOWUPINSTRUCTIONS_ED_ALL_ED_FT
You were seen in the Emergency Department for abdominal pain.     1) Advance activity as tolerated.   2) New prescription sent to pharmacy indicated in interview take prescription as prescribed. Continue all previously prescribed medications as directed.    3) Follow up with your primary care physician in 24-48 hours - take copies of your results.    4) Return to the Emergency Department for worsening or persistent symptoms, and/or ANY NEW OR CONCERNING SYMPTOMS.       Abdominal Pain    Many things can cause abdominal pain. Many times, abdominal pain is not caused by a disease and will improve without treatment. Your health care provider will do a physical exam to determine if there is a dangerous cause of your pain; blood tests and imaging may help determine the cause of your pain. However, in many cases, no cause may be found and you may need further testing as an outpatient. Monitor your abdominal pain for any changes.     SEEK IMMEDIATE MEDICAL CARE IF YOU HAVE ANY OF THE FOLLOWING SYMPTOMS: worsening abdominal pain, uncontrollable vomiting, profuse diarrhea, inability to have bowel movements or pass gas, black or bloody stools, fever accompanying chest pain or back pain, or fainting. These symptoms may represent a serious problem that is an emergency. Do not wait to see if the symptoms will go away. Get medical help right away. Call 911 and do not drive yourself to the hospital.

## 2024-10-07 NOTE — ED ADULT NURSE NOTE - OBJECTIVE STATEMENT
Covering for primary RN Randall. AAOx3 pt presents to ED c/o of left sided abdominal pain, N/D starting 2 days ago. Pt reports feeling similar symptoms when diagnosed with diverticulitis about a year ago. Pt respirations spontaneous and unlabored

## 2024-10-08 LAB
CULTURE RESULTS: SIGNIFICANT CHANGE UP
SPECIMEN SOURCE: SIGNIFICANT CHANGE UP